# Patient Record
Sex: MALE | Race: WHITE | NOT HISPANIC OR LATINO | Employment: UNEMPLOYED | URBAN - NONMETROPOLITAN AREA
[De-identification: names, ages, dates, MRNs, and addresses within clinical notes are randomized per-mention and may not be internally consistent; named-entity substitution may affect disease eponyms.]

---

## 2018-09-07 ENCOUNTER — APPOINTMENT (OUTPATIENT)
Dept: GENERAL RADIOLOGY | Age: 53
End: 2018-09-07
Attending: EMERGENCY MEDICINE

## 2018-09-07 ENCOUNTER — HOSPITAL ENCOUNTER (EMERGENCY)
Age: 53
Discharge: PSYCHIATRIC OR SUBSTANCE USE FACILITY | End: 2018-09-07
Attending: EMERGENCY MEDICINE

## 2018-09-07 VITALS
HEART RATE: 71 BPM | OXYGEN SATURATION: 97 % | SYSTOLIC BLOOD PRESSURE: 131 MMHG | HEIGHT: 68 IN | TEMPERATURE: 98 F | WEIGHT: 200 LBS | BODY MASS INDEX: 30.31 KG/M2 | RESPIRATION RATE: 17 BRPM | DIASTOLIC BLOOD PRESSURE: 78 MMHG

## 2018-09-07 DIAGNOSIS — F10.220 ALCOHOL INTOXICATION IN ACTIVE ALCOHOLIC WITHOUT COMPLICATION (CMD): Primary | ICD-10-CM

## 2018-09-07 DIAGNOSIS — R06.00 DYSPNEA, UNSPECIFIED TYPE: ICD-10-CM

## 2018-09-07 DIAGNOSIS — J06.9 ACUTE URI: ICD-10-CM

## 2018-09-07 DIAGNOSIS — F17.200 TOBACCO DEPENDENCE: ICD-10-CM

## 2018-09-07 LAB
ALBUMIN SERPL-MCNC: 4.1 G/DL (ref 3.6–5.1)
ALBUMIN/GLOB SERPL: 1.2 {RATIO} (ref 1–2.4)
ALP SERPL-CCNC: 78 UNITS/L (ref 45–117)
ALT SERPL-CCNC: 50 UNITS/L
AMPHETAMINES UR QL: NEGATIVE
ANION GAP SERPL CALC-SCNC: 12 MMOL/L (ref 10–20)
APPEARANCE UR: CLEAR
AST SERPL-CCNC: 53 UNITS/L
BARBITURATES UR QL: NEGATIVE
BASOPHILS # BLD AUTO: 0 K/MCL (ref 0–0.3)
BASOPHILS NFR BLD AUTO: 0 %
BENZODIAZ UR QL: NEGATIVE
BILIRUB SERPL-MCNC: 0.4 MG/DL (ref 0.2–1)
BILIRUB UR QL STRIP: NEGATIVE
BUN SERPL-MCNC: 9 MG/DL (ref 6–20)
BUN/CREAT SERPL: 10 (ref 7–25)
BZE UR QL: NEGATIVE
CALCIUM SERPL-MCNC: 8.3 MG/DL (ref 8.4–10.2)
CANNABINOIDS UR QL SCN: NEGATIVE
CHLORIDE SERPL-SCNC: 109 MMOL/L (ref 98–107)
CO2 SERPL-SCNC: 32 MMOL/L (ref 21–32)
COLOR UR: YELLOW
CREAT SERPL-MCNC: 0.92 MG/DL (ref 0.67–1.17)
D DIMER PPP FEU-MCNC: 0.53 MG/L (FEU)
DIFFERENTIAL METHOD BLD: ABNORMAL
EOSINOPHIL # BLD AUTO: 0.1 K/MCL (ref 0.1–0.5)
EOSINOPHIL NFR SPEC: 1 %
ERYTHROCYTE [DISTWIDTH] IN BLOOD: 12.9 % (ref 11–15)
ETHANOL SERPL-MCNC: 438 MG/DL
GLOBULIN SER-MCNC: 3.3 G/DL (ref 2–4)
GLUCOSE SERPL-MCNC: 115 MG/DL (ref 65–99)
GLUCOSE UR STRIP-MCNC: NEGATIVE MG/DL
HCT VFR BLD CALC: 47.8 % (ref 39–51)
HGB BLD-MCNC: 16.7 G/DL (ref 13–17)
HGB UR QL STRIP: NEGATIVE
KETONES UR STRIP-MCNC: NEGATIVE MG/DL
LEUKOCYTE ESTERASE UR QL STRIP: NEGATIVE
LYMPHOCYTES # BLD MANUAL: 0.9 K/MCL (ref 1–4)
LYMPHOCYTES NFR BLD MANUAL: 17 %
MAGNESIUM SERPL-MCNC: 2.4 MG/DL (ref 1.7–2.4)
MCH RBC QN AUTO: 33.9 PG (ref 26–34)
MCHC RBC AUTO-ENTMCNC: 34.9 G/DL (ref 32–36.5)
MCV RBC AUTO: 97.2 FL (ref 78–100)
MONOCYTES # BLD MANUAL: 0.3 K/MCL (ref 0.3–0.9)
MONOCYTES NFR BLD MANUAL: 6 %
NEUTROPHILS # BLD: 4.2 K/MCL (ref 1.8–7.7)
NEUTROPHILS NFR BLD AUTO: 76 %
NITRITE UR QL STRIP: NEGATIVE
OPIATES UR QL: NEGATIVE
PCP UR QL: NEGATIVE
PH UR STRIP: 6.5 UNITS (ref 5–7)
PLATELET # BLD: 260 K/MCL (ref 140–450)
POTASSIUM SERPL-SCNC: 4.2 MMOL/L (ref 3.4–5.1)
PROT SERPL-MCNC: 7.4 G/DL (ref 6.4–8.2)
PROT UR STRIP-MCNC: NEGATIVE MG/DL
RBC # BLD: 4.92 MIL/MCL (ref 4.5–5.9)
SODIUM SERPL-SCNC: 149 MMOL/L (ref 135–145)
SP GR UR STRIP: 1.01 (ref 1–1.03)
SPECIMEN SOURCE: NORMAL
TROPONIN I SERPL-MCNC: <0.02 NG/ML
UROBILINOGEN UR STRIP-MCNC: 0.2 MG/DL (ref 0–1)
WBC # BLD: 5.5 K/MCL (ref 4.2–11)

## 2018-09-07 PROCEDURE — 83735 ASSAY OF MAGNESIUM: CPT

## 2018-09-07 PROCEDURE — 99285 EMERGENCY DEPT VISIT HI MDM: CPT

## 2018-09-07 PROCEDURE — 99284 EMERGENCY DEPT VISIT MOD MDM: CPT | Performed by: EMERGENCY MEDICINE

## 2018-09-07 PROCEDURE — 71045 X-RAY EXAM CHEST 1 VIEW: CPT

## 2018-09-07 PROCEDURE — 81003 URINALYSIS AUTO W/O SCOPE: CPT

## 2018-09-07 PROCEDURE — 85025 COMPLETE CBC W/AUTO DIFF WBC: CPT

## 2018-09-07 PROCEDURE — 80320 DRUG SCREEN QUANTALCOHOLS: CPT

## 2018-09-07 PROCEDURE — 80053 COMPREHEN METABOLIC PANEL: CPT

## 2018-09-07 PROCEDURE — 80307 DRUG TEST PRSMV CHEM ANLYZR: CPT

## 2018-09-07 PROCEDURE — 93005 ELECTROCARDIOGRAM TRACING: CPT | Performed by: EMERGENCY MEDICINE

## 2018-09-07 PROCEDURE — 85379 FIBRIN DEGRADATION QUANT: CPT

## 2018-09-07 PROCEDURE — 93010 ELECTROCARDIOGRAM REPORT: CPT | Performed by: INTERNAL MEDICINE

## 2018-09-07 PROCEDURE — 36415 COLL VENOUS BLD VENIPUNCTURE: CPT

## 2018-09-07 PROCEDURE — 71045 X-RAY EXAM CHEST 1 VIEW: CPT | Performed by: RADIOLOGY

## 2018-09-07 PROCEDURE — 84484 ASSAY OF TROPONIN QUANT: CPT

## 2018-09-07 RX ORDER — LEVOTHYROXINE SODIUM 0.1 MG/1
100 TABLET ORAL DAILY
COMMUNITY

## 2018-09-07 ASSESSMENT — ENCOUNTER SYMPTOMS
APPETITE CHANGE: 0
COLOR CHANGE: 0
DIAPHORESIS: 0
LIGHT-HEADEDNESS: 0
NUMBNESS: 0
COUGH: 1
RHINORRHEA: 1
SORE THROAT: 0
WOUND: 0
SINUS PAIN: 0
CONSTIPATION: 0
DIZZINESS: 0
VOMITING: 0
SPEECH DIFFICULTY: 0
TROUBLE SWALLOWING: 0
CONFUSION: 0
ACTIVITY CHANGE: 0
WEAKNESS: 0
WHEEZING: 1
FACIAL ASYMMETRY: 0
NAUSEA: 0
DIARRHEA: 0
HEADACHES: 0
BACK PAIN: 0
HALLUCINATIONS: 0
STRIDOR: 0
SHORTNESS OF BREATH: 1
FACIAL SWELLING: 0
CHEST TIGHTNESS: 0
FEVER: 0
ABDOMINAL PAIN: 0
AGITATION: 1
FATIGUE: 0
CHILLS: 0

## 2018-09-10 LAB
ATRIAL RATE (BPM): 71
DIASTOLIC BLOOD PRESSURE: 101
P AXIS (DEGREES): 60
PR-INTERVAL (MSEC): 130
QRS-INTERVAL (MSEC): 92
QT-INTERVAL (MSEC): 380
QTC: 412
R AXIS (DEGREES): 21
REPORT TEXT: NORMAL
SYSTOLIC BLOOD PRESSURE: 159
T AXIS (DEGREES): 68
VENTRICULAR RATE EKG/MIN (BPM): 71

## 2018-09-11 ENCOUNTER — OFFICE VISIT (OUTPATIENT)
Dept: OCCUPATIONAL MEDICINE | Age: 53
End: 2018-09-11

## 2018-09-11 DIAGNOSIS — Z00.8 HEALTH EXAMINATION IN POPULATION SURVEY: Primary | ICD-10-CM

## 2018-09-11 PROCEDURE — OH035 DOT/N-DOT DS COLLECTION ONLY (ALL TYPES): Performed by: PHYSICIAN ASSISTANT

## 2020-09-28 ENCOUNTER — APPOINTMENT (OUTPATIENT)
Dept: RADIOLOGY | Facility: HOSPITAL | Age: 55
End: 2020-09-28
Payer: COMMERCIAL

## 2020-09-28 ENCOUNTER — APPOINTMENT (OUTPATIENT)
Dept: CT IMAGING | Facility: HOSPITAL | Age: 55
End: 2020-09-28
Payer: COMMERCIAL

## 2020-09-28 ENCOUNTER — HOSPITAL ENCOUNTER (EMERGENCY)
Facility: HOSPITAL | Age: 55
Discharge: HOME | End: 2020-09-28
Attending: EMERGENCY MEDICINE
Payer: COMMERCIAL

## 2020-09-28 VITALS
HEIGHT: 67 IN | DIASTOLIC BLOOD PRESSURE: 111 MMHG | RESPIRATION RATE: 16 BRPM | BODY MASS INDEX: 26.9 KG/M2 | HEART RATE: 79 BPM | SYSTOLIC BLOOD PRESSURE: 160 MMHG | WEIGHT: 171.4 LBS | OXYGEN SATURATION: 94 % | TEMPERATURE: 98.1 F

## 2020-09-28 DIAGNOSIS — R11.0 NAUSEA: ICD-10-CM

## 2020-09-28 DIAGNOSIS — R50.9 FEVER: ICD-10-CM

## 2020-09-28 DIAGNOSIS — R05.9 COUGH: Primary | ICD-10-CM

## 2020-09-28 LAB
ANION GAP SERPL CALC-SCNC: 6 MMOL/L (ref 3–11)
BASOPHILS # BLD AUTO: 0 10*3/UL
BASOPHILS NFR BLD AUTO: 1 % (ref 0–2)
BUN SERPL-MCNC: 13 MG/DL (ref 7–25)
CALCIUM SERPL-MCNC: 7.6 MG/DL (ref 8.6–10.3)
CHLORIDE SERPL-SCNC: 101 MMOL/L (ref 98–107)
CO2 SERPL-SCNC: 24 MMOL/L (ref 21–32)
CREAT SERPL-MCNC: 0.78 MG/DL (ref 0.7–1.3)
EOSINOPHIL # BLD AUTO: 0.2 10*3/UL
EOSINOPHIL NFR BLD AUTO: 5 % (ref 0–3)
ERYTHROCYTE [DISTWIDTH] IN BLOOD BY AUTOMATED COUNT: 13.6 % (ref 11.5–15)
FIBRIN D-DIMER (NG/ML) IN PLATELET POOR PLASMA: 1576 NG/ML FEU
GFR SERPL CREATININE-BSD FRML MDRD: 101 ML/MIN/1.73M*2
GLUCOSE SERPL-MCNC: 108 MG/DL (ref 70–105)
HCT VFR BLD AUTO: 42.8 % (ref 38–50)
HGB BLD-MCNC: 15.3 G/DL (ref 13.2–17.2)
LYMPHOCYTES # BLD AUTO: 0.4 10*3/UL
LYMPHOCYTES NFR BLD AUTO: 12 % (ref 15–47)
MCH RBC QN AUTO: 33.7 PG (ref 29–34)
MCHC RBC AUTO-ENTMCNC: 35.8 G/DL (ref 32–36)
MCV RBC AUTO: 94.1 FL (ref 82–97)
MONOCYTES # BLD AUTO: 0.2 10*3/UL
MONOCYTES NFR BLD AUTO: 6 % (ref 5–13)
NEUTROPHILS # BLD AUTO: 2.6 10*3/UL
NEUTROPHILS NFR BLD AUTO: 76 % (ref 46–70)
PLATELET # BLD AUTO: 175 10*3/UL (ref 130–350)
PMV BLD AUTO: 7.4 FL (ref 6.9–10.8)
POTASSIUM SERPL-SCNC: 3.9 MMOL/L (ref 3.5–5.1)
RBC # BLD AUTO: 4.55 10*6/ΜL (ref 4.1–5.8)
SARS-COV-2 RNA RESP QL NAA+PROBE: NEGATIVE
SODIUM SERPL-SCNC: 131 MMOL/L (ref 135–145)
TROPONIN I SERPL-MCNC: 10.6 PG/ML
WBC # BLD AUTO: 3.5 10*3/UL (ref 3.7–9.6)

## 2020-09-28 PROCEDURE — 85379 FIBRIN DEGRADATION QUANT: CPT | Performed by: NURSE PRACTITIONER

## 2020-09-28 PROCEDURE — 36415 COLL VENOUS BLD VENIPUNCTURE: CPT | Performed by: NURSE PRACTITIONER

## 2020-09-28 PROCEDURE — 99284 EMERGENCY DEPT VISIT MOD MDM: CPT | Performed by: EMERGENCY MEDICINE

## 2020-09-28 PROCEDURE — 93005 ELECTROCARDIOGRAM TRACING: CPT

## 2020-09-28 PROCEDURE — 71045 X-RAY EXAM CHEST 1 VIEW: CPT

## 2020-09-28 PROCEDURE — 85025 COMPLETE CBC W/AUTO DIFF WBC: CPT | Performed by: NURSE PRACTITIONER

## 2020-09-28 PROCEDURE — 2580000300 HC RX 258: Performed by: NURSE PRACTITIONER

## 2020-09-28 PROCEDURE — 87635 SARS-COV-2 COVID-19 AMP PRB: CPT | Performed by: NURSE PRACTITIONER

## 2020-09-28 PROCEDURE — C9803 HOPD COVID-19 SPEC COLLECT: HCPCS | Performed by: NURSE PRACTITIONER

## 2020-09-28 PROCEDURE — 84484 ASSAY OF TROPONIN QUANT: CPT | Performed by: NURSE PRACTITIONER

## 2020-09-28 PROCEDURE — 2550000100 HC RX 255: Performed by: NURSE PRACTITIONER

## 2020-09-28 PROCEDURE — 80048 BASIC METABOLIC PNL TOTAL CA: CPT | Performed by: NURSE PRACTITIONER

## 2020-09-28 PROCEDURE — G1004 CDSM NDSC: HCPCS

## 2020-09-28 RX ORDER — ATORVASTATIN CALCIUM 20 MG/1
20 TABLET, FILM COATED ORAL DAILY
COMMUNITY

## 2020-09-28 RX ORDER — ONDANSETRON 4 MG/1
4 TABLET, ORALLY DISINTEGRATING ORAL EVERY 8 HOURS PRN
Qty: 20 TABLET | Refills: 0 | Status: SHIPPED | OUTPATIENT
Start: 2020-09-28 | End: 2020-10-05

## 2020-09-28 RX ORDER — TAMSULOSIN HYDROCHLORIDE 0.4 MG/1
0.4 CAPSULE ORAL DAILY
COMMUNITY

## 2020-09-28 RX ORDER — LEVOTHYROXINE SODIUM 125 UG/1
125 TABLET ORAL DAILY
COMMUNITY

## 2020-09-28 RX ORDER — SODIUM CHLORIDE 9 MG/ML
1000 INJECTION, SOLUTION INTRAVENOUS ONCE
Status: COMPLETED | OUTPATIENT
Start: 2020-09-28 | End: 2020-09-28

## 2020-09-28 RX ORDER — IOPAMIDOL 755 MG/ML
61 INJECTION, SOLUTION INTRAVASCULAR ONCE
Status: COMPLETED | OUTPATIENT
Start: 2020-09-28 | End: 2020-09-28

## 2020-09-28 RX ADMIN — IOPAMIDOL 61 ML: 755 INJECTION, SOLUTION INTRAVENOUS at 14:15

## 2020-09-28 RX ADMIN — SODIUM CHLORIDE 1000 ML: 9 INJECTION, SOLUTION INTRAVENOUS at 12:05

## 2020-09-28 ASSESSMENT — ENCOUNTER SYMPTOMS
ARTHRALGIAS: 0
PALPITATIONS: 0
EYE PAIN: 0
SORE THROAT: 0
FEVER: 0
APPETITE CHANGE: 1
NAUSEA: 1
HEMATURIA: 0
FATIGUE: 1
BACK PAIN: 0
COUGH: 0
DIARRHEA: 0
SHORTNESS OF BREATH: 1
DYSURIA: 0
ABDOMINAL PAIN: 0
SEIZURES: 0
COLOR CHANGE: 0
VOMITING: 1
ACTIVITY CHANGE: 1
CHILLS: 0

## 2020-09-28 NOTE — ED PROCEDURE NOTE
Procedure  ECG 12 lead - Shortness of breath    Date/Time: 9/28/2020 12:27 PM  Performed by: Jayce Mccray MD  Authorized by: Guera Post CNP     ECG reviewed by ED Physician in the absence of a cardiologist: yes    Previous ECG:     Previous ECG:  Unavailable  Comments:      Sinus rhythm with a rate of 80, intervals normal, axis upright and normal, isolated PVC noted.  T waves upright and normal, no ST elevation or depressions noted.                 Jayce Mccray MD  09/28/20 2048

## 2020-09-28 NOTE — ED PROVIDER NOTES
HPI:  Chief Complaint   Patient presents with   • Nausea     Pt. states fever and chills started last night. Pt. also complains of dizziness, nausea and vomiting.        4-year-old male presents with  2 days nausea, 1-2 episodes of vomiting, chills, body aches, occasional cough and sore throat.  Patient is a smoker and feels the cough is similar to previous smoker's cough and not new or different.  He also admits dizziness described as lightheadedness and weakness that worsens when changing positions quickly that is associated with subjective fever.  Patient denies diarrhea, dysuria.  Also admits some chest tightness and shortness of breath that he has not ever had before.  Denies history of coronary artery disease or previous stress test but does have a significant family history of CVA and stroke.  He is a current smoker of half pack per day for many years.  He has been resting in his hotel room and taking ibuprofen,  tylenol without improvement.  States he has no appetite but is uncertain about loss of taste or smell.  Patient has a recent history of traveling from [place] via car.  States he did get out of the vehicle every 2-3 hours to stretch his legs and is not have a history of blood clot in his legs or lungs.  He does also have a recent history of meniscal tear and knee arthroscopy 2 weeks ago.  States this is healing and without difficulty.  He denies any unexpected discomfort to the knee, low right lower extremity swelling.  Admits bruising is significantly improving.      History provided by:  Patient      HISTORY:  Past Medical History:   Diagnosis Date   • BPH (benign prostatic hyperplasia)    • Disease of thyroid gland    • Hypercholesteremia        Past Surgical History:   Procedure Laterality Date   • HERNIA REPAIR     • KNEE SURGERY         History reviewed. No pertinent family history.    Social History     Tobacco Use   • Smoking status: Current Every Day Smoker     Packs/day: 0.50      Types: Cigarettes   • Smokeless tobacco: Never Used   Substance Use Topics   • Alcohol use: Yes   • Drug use: Not Currently         ROS:  Review of Systems   Constitutional: Positive for activity change, appetite change and fatigue. Negative for chills and fever.   HENT: Negative for congestion, ear pain and sore throat.    Eyes: Negative for pain and visual disturbance.   Respiratory: Positive for shortness of breath. Negative for cough.    Cardiovascular: Negative for chest pain and palpitations.   Gastrointestinal: Positive for nausea and vomiting. Negative for abdominal pain and diarrhea.   Genitourinary: Negative for dysuria and hematuria.   Musculoskeletal: Negative for arthralgias and back pain.   Skin: Negative for color change and rash.   Neurological: Negative for seizures and syncope.   All other systems reviewed and are negative.      PE:  ED Triage Vitals   Temp Heart Rate Resp BP SpO2   09/28/20 1052 09/28/20 1052 09/28/20 1052 09/28/20 1052 09/28/20 1052   36.7 °C (98.1 °F) 105 16 (!) 191/115 95 %      Temp Source Heart Rate Source Patient Position BP Location FiO2 (%)   09/28/20 1052 -- 09/28/20 1130 -- --   Oral  Head of bed 30 degrees or higher         Physical Exam  Vitals signs and nursing note reviewed.   Constitutional:       Appearance: He is well-developed and normal weight.   HENT:      Head: Normocephalic and atraumatic.      Nose: Nose normal.      Mouth/Throat:      Mouth: Mucous membranes are dry.   Eyes:      Conjunctiva/sclera: Conjunctivae normal.   Neck:      Musculoskeletal: Neck supple.   Cardiovascular:      Rate and Rhythm: Normal rate and regular rhythm.      Heart sounds: No murmur.   Pulmonary:      Effort: Pulmonary effort is normal. No respiratory distress.      Breath sounds: Normal breath sounds.   Abdominal:      General: Abdomen is flat. Bowel sounds are normal.      Palpations: Abdomen is soft.      Tenderness: There is no abdominal tenderness.   Skin:     General: Skin  "is warm and dry.   Neurological:      General: No focal deficit present.      Mental Status: He is alert and oriented to person, place, and time.         ED LABS:  Labs Reviewed   D-DIMER, QUANTITATIVE - Abnormal       Result Value    D-Dimer, Quant (ng/mL) 1,576 (*)     Narrative:     D-dimer values increase with age and this can make VTE exclusion of an older population difficult. To address this, The [employer], based on best available evidence and recent guidelines, recommends that clinicians use age-adjusted D-dimer thresholds in patients greater than 50 years of age with: a) a low probability of PE who do not meet all Pulmonary Embolism Rule Out Criteria, or b) in those with intermediate probability of PE. The formula for an age-adjusted D-dimer cut-off is \"ageX10 ng/mL\". For example, a 60 year old patient would have an age-adjusted cut-off of 600 ng/mL FEU and an 80 year old would be 800 ng/mL FEU.  D-dimer values < or =500 ng/mL FEU may be used in conjunction with clinical pretest probability to exclude deep vein thrombosis (DVT) and pulmonary embolism (PE).   CBC WITH AUTO DIFFERENTIAL - Abnormal    WBC 3.5 (*)     RBC 4.55      Hemoglobin 15.3      Hematocrit 42.8      MCV 94.1      MCH 33.7      MCHC 35.8      RDW 13.6      Platelets 175      MPV 7.4      Neutrophils% 76 (*)     Lymphocytes% 12 (*)     Monocytes% 6      Eosinophils% 5 (*)     Basophils% 1      Neutrophils Absolute 2.60      Lymphocytes Absolute 0.40      Monocytes Absolute 0.20      Eosinophils Absolute 0.20      Basophils Absolute 0.00     BASIC METABOLIC PANEL - Abnormal    Sodium 131 (*)     Potassium 3.9      Chloride 101      CO2 24      BUN 13      Creatinine 0.78      Glucose 108 (*)     Calcium 7.6 (*)     Anion Gap 6      eGFR 101      Narrative:     Estimated GFR calculated using the 2009 CKD-EPI creatinine equation.   COVID-19 MOLECULAR (PCR) - Normal    COVID-19 PCR Negative      Narrative:     COVID-19 NP " sample collection can be used for Respiratory Pathogen Panel testing.  A negative result may not rule out COVID-19 in the patient, as the sensitivity of the test depends on the timing of the specimen collection, type of specimen, and the quality of the specimen. Result should be correlated with patient's history and clinical presentation.    Performance of this SARS-CoV-2 RNA test has only been established in individuals suspected of having COVID-19 by their healthcare provider.    Testing was performed using the Xpert Xpress SARS-CoV-2 RT-PCR assay ([employer]) on the [employer] system.  Fact sheets for this Emergency Use Authorization (EUA) assay can be found at the following links:  For Healthcare Providers  https://www.fda.gov/media/130222/download  For Patients  https://www.fda.gov/media/671836/download   HIGH SENSITIVE TROPONIN I - Normal    hsTnI 0 Hour 10.6           ED IMAGES:  CT angiogram chest PE with IV contrast   Final Result   IMPRESSION:   1. Negative CTA for pulmonary embolism.      XR chest portable 1 view   Final Result   IMPRESSION:   1. Negative chest             ED PROCEDURES:  Procedures    ED COURSE:  ED Course as of Sep 28 1424   Mon Sep 28, 2020   1249 No evidence of pneumonia on chest x-ray.  No significant white blood cell count although leukopenia consistent with possible viral illness.  Troponin is negative.  EKG is negative for acute ST-T wave changes diagnostic for ischemia or injury.  D-dimer is elevated.  For this reason CTA will be obtained to rule out PE.    [AJ]   1418 CTA negative for PE.  Patient is not hypoxic and no acute respiratory distress.  At this point he is felt stable for discharge.  Continued supportive cares and return if worsening symptoms.     [AJ]      ED Course User Index  [AJ] Guera Post CNP            MDM:  MDM  54-year-old male presents with multiple complaints including nausea, vomiting, and poor appetite dizziness worsening over the last 1-1/2 to 2  days.  He has recent travel with long episodes in the car as well as a recent elective surgery for which she was discharged the day of.  Does have some risk factors for PE given her shortness of breath and chest pain for this reason d-dimer will be obtained, lipid screening and baseline labs.  At this point patient has no knowledge of previous elevated blood pressure and states this is because he is in the emergency department we will continue to evaluate this.      Josh Hyde was evaluated in the Emergency Department on 9/28/2020 for the symptoms described in the history of present illness. He was evaluated in the context of the global COVID-19 pandemic, which necessitated consideration that the patient might be at risk for infection with the SARS-COV-2 virus that causes COVID-19. Institutional protocols and algorithms that pertain to the evaluation of patients at risk for COVID-19 are in a state of rapid change based on information released by regulatory bodies including the CDC and federal and state regulations. These policies and algorithms were followed during the patient's care in the ED.  Final diagnoses:   [R05] Cough   [R50.9] Fever   [R11.0] Nausea        Guera Post, JACQUES  09/28/20 [id number]

## 2020-09-28 NOTE — DISCHARGE INSTRUCTIONS
Continue supportive care for perceived URI.  COVID screening is negative.  May use Tylenol, ibuprofen, over-the-counter decongestants or cold medicines.  Monitor blood pressure once you are feeling well and see her primary care provider if this remains above 140/90 on a routine basis as you may need to consider treatment for high blood pressure.      You will be provided with a prescription for nausea medication you may use this 3 times daily as needed, drink adequate fluids to prevent constipation which is a common adverse effect.

## 2020-09-28 NOTE — ED ATTESTATION NOTE
I performed a history and physical examination of Josh Hyde and discussed his management with Advanced Practice Provider, Guera Post CNP.  I agree with the history, physical, assessment, and plan of care.  On my face-to-face visit with patient, 54-year-old male presents here to the ED with complaints of dizziness, nauseavomiting, generalized myalgias, and mild shortness of breath.  Patient had vomiting 1-2 episodes.  Physical exam with clear lungs, mild tachycardia noted on the monitor, and heart rate with no murmurs rubs or gallops.  No lower extremity edema.  Abdomen is benign.  Work-up showed a mild hyponatremia to 131, and elevated d-dimer to 1576.  COVID was negative.  Troponin normal.  Due to elevated dimer and troponin negative, CTA was done to evaluate for PE.  CTA unremarkable for dissection or for PE.  Discussed findings with patient.  Feel safe to go home with URI at this time.  Patient with no history of hypertension, and blood pressure in the ED in the 150s 160s, but patient states that is due to anxiety about being in the ED.  Feel safe to discharge.         Jayce Mccray MD  09/28/20 0219

## 2021-07-28 DIAGNOSIS — M54.16 LUMBAR RADICULOPATHY: Primary | ICD-10-CM

## 2021-08-11 ENCOUNTER — HOSPITAL ENCOUNTER (OUTPATIENT)
Dept: REHABILITATION | Age: 56
Discharge: STILL ADMITTED OR ADMINISTRATIVE | End: 2021-08-11
Attending: NEUROLOGICAL SURGERY

## 2021-08-11 PROCEDURE — 97162 PT EVAL MOD COMPLEX 30 MIN: CPT | Performed by: PHYSICAL THERAPIST

## 2021-08-11 PROCEDURE — 10004173 HB COUNTER-THERAPY VISIT PT: Performed by: PHYSICAL THERAPIST

## 2021-08-11 ASSESSMENT — ENCOUNTER SYMPTOMS
SUBJECTIVE PAIN PROGRESSION: NO CHANGE
PAIN SCALE AT HIGHEST: 9
QUALITY: SHARP
PAIN SEVERITY NOW: 5
PAIN SCALE AT LOWEST: 1
QUALITY: ACHE
ALLEVIATING FACTORS: REST
ALLEVIATING FACTORS: ICE

## 2021-08-18 ENCOUNTER — HOSPITAL ENCOUNTER (OUTPATIENT)
Dept: REHABILITATION | Age: 56
Discharge: STILL ADMITTED OR ADMINISTRATIVE | End: 2021-08-18
Attending: NEUROLOGICAL SURGERY

## 2021-08-18 PROCEDURE — 97110 THERAPEUTIC EXERCISES: CPT | Performed by: PHYSICAL THERAPIST

## 2021-08-18 PROCEDURE — 10004173 HB COUNTER-THERAPY VISIT PT: Performed by: PHYSICAL THERAPIST

## 2021-08-18 PROCEDURE — 97140 MANUAL THERAPY 1/> REGIONS: CPT | Performed by: PHYSICAL THERAPIST

## 2021-08-20 ENCOUNTER — HOSPITAL ENCOUNTER (OUTPATIENT)
Dept: REHABILITATION | Age: 56
End: 2021-08-20
Attending: NEUROLOGICAL SURGERY

## 2021-08-25 ENCOUNTER — APPOINTMENT (OUTPATIENT)
Dept: REHABILITATION | Age: 56
End: 2021-08-25
Attending: NEUROLOGICAL SURGERY

## 2021-08-31 ENCOUNTER — APPOINTMENT (OUTPATIENT)
Dept: REHABILITATION | Age: 56
End: 2021-08-31
Attending: NEUROLOGICAL SURGERY

## 2021-09-03 ENCOUNTER — APPOINTMENT (OUTPATIENT)
Dept: REHABILITATION | Age: 56
End: 2021-09-03
Attending: NEUROLOGICAL SURGERY

## 2021-09-07 ENCOUNTER — HOSPITAL ENCOUNTER (OUTPATIENT)
Dept: REHABILITATION | Age: 56
Discharge: STILL ADMITTED OR ADMINISTRATIVE | End: 2021-09-07
Attending: NEUROLOGICAL SURGERY

## 2021-09-07 PROCEDURE — 97110 THERAPEUTIC EXERCISES: CPT | Performed by: PHYSICAL THERAPIST

## 2021-09-07 PROCEDURE — 10004173 HB COUNTER-THERAPY VISIT PT: Performed by: PHYSICAL THERAPIST

## 2021-09-07 PROCEDURE — 97140 MANUAL THERAPY 1/> REGIONS: CPT | Performed by: PHYSICAL THERAPIST

## 2021-09-07 ASSESSMENT — ENCOUNTER SYMPTOMS: PAIN SEVERITY NOW: 6

## 2021-09-09 ENCOUNTER — APPOINTMENT (OUTPATIENT)
Dept: REHABILITATION | Age: 56
End: 2021-09-09
Attending: NEUROLOGICAL SURGERY

## 2021-09-10 ENCOUNTER — HOSPITAL ENCOUNTER (OUTPATIENT)
Dept: REHABILITATION | Age: 56
Discharge: STILL ADMITTED OR ADMINISTRATIVE | End: 2021-09-10
Attending: NEUROLOGICAL SURGERY

## 2021-09-10 PROCEDURE — 97110 THERAPEUTIC EXERCISES: CPT | Performed by: PHYSICAL THERAPY ASSISTANT

## 2021-09-10 PROCEDURE — 10004173 HB COUNTER-THERAPY VISIT PT: Performed by: PHYSICAL THERAPY ASSISTANT

## 2021-09-10 PROCEDURE — 97140 MANUAL THERAPY 1/> REGIONS: CPT | Performed by: PHYSICAL THERAPY ASSISTANT

## 2021-09-10 ASSESSMENT — ENCOUNTER SYMPTOMS: PAIN SEVERITY NOW: 5

## 2021-09-14 ENCOUNTER — HOSPITAL ENCOUNTER (OUTPATIENT)
Dept: REHABILITATION | Age: 56
Discharge: STILL ADMITTED OR ADMINISTRATIVE | End: 2021-09-14
Attending: NEUROLOGICAL SURGERY

## 2021-09-14 PROCEDURE — 97110 THERAPEUTIC EXERCISES: CPT | Performed by: PHYSICAL THERAPIST

## 2021-09-14 PROCEDURE — 97530 THERAPEUTIC ACTIVITIES: CPT | Performed by: PHYSICAL THERAPIST

## 2021-09-14 PROCEDURE — 97140 MANUAL THERAPY 1/> REGIONS: CPT | Performed by: PHYSICAL THERAPIST

## 2021-09-14 PROCEDURE — 10004173 HB COUNTER-THERAPY VISIT PT: Performed by: PHYSICAL THERAPIST

## 2021-09-14 ASSESSMENT — ENCOUNTER SYMPTOMS: PAIN SEVERITY NOW: 3

## 2021-09-17 ENCOUNTER — HOSPITAL ENCOUNTER (OUTPATIENT)
Dept: REHABILITATION | Age: 56
Discharge: STILL ADMITTED OR ADMINISTRATIVE | End: 2021-09-17
Attending: NEUROLOGICAL SURGERY

## 2021-09-17 PROCEDURE — 10004173 HB COUNTER-THERAPY VISIT PT: Performed by: PHYSICAL THERAPIST

## 2021-09-17 PROCEDURE — 97110 THERAPEUTIC EXERCISES: CPT | Performed by: PHYSICAL THERAPIST

## 2021-09-17 PROCEDURE — 97140 MANUAL THERAPY 1/> REGIONS: CPT | Performed by: PHYSICAL THERAPIST

## 2021-09-17 ASSESSMENT — ENCOUNTER SYMPTOMS: PAIN SEVERITY NOW: 3

## 2021-09-21 ENCOUNTER — HOSPITAL ENCOUNTER (OUTPATIENT)
Dept: REHABILITATION | Age: 56
Discharge: STILL ADMITTED OR ADMINISTRATIVE | End: 2021-09-21
Attending: NEUROLOGICAL SURGERY

## 2021-09-21 PROCEDURE — 10004173 HB COUNTER-THERAPY VISIT PT: Performed by: PHYSICAL THERAPIST

## 2021-09-21 PROCEDURE — 97140 MANUAL THERAPY 1/> REGIONS: CPT | Performed by: PHYSICAL THERAPIST

## 2021-09-21 PROCEDURE — 97110 THERAPEUTIC EXERCISES: CPT | Performed by: PHYSICAL THERAPIST

## 2021-09-21 ASSESSMENT — ENCOUNTER SYMPTOMS: PAIN SEVERITY NOW: 6

## 2021-09-24 ENCOUNTER — HOSPITAL ENCOUNTER (OUTPATIENT)
Dept: REHABILITATION | Age: 56
Discharge: STILL ADMITTED OR ADMINISTRATIVE | End: 2021-09-24
Attending: NEUROLOGICAL SURGERY

## 2021-09-24 PROCEDURE — 97140 MANUAL THERAPY 1/> REGIONS: CPT | Performed by: PHYSICAL THERAPY ASSISTANT

## 2021-09-24 PROCEDURE — 97110 THERAPEUTIC EXERCISES: CPT | Performed by: PHYSICAL THERAPY ASSISTANT

## 2021-09-24 PROCEDURE — 10004173 HB COUNTER-THERAPY VISIT PT: Performed by: PHYSICAL THERAPY ASSISTANT

## 2021-09-24 ASSESSMENT — ENCOUNTER SYMPTOMS: PAIN SEVERITY NOW: 3

## 2021-09-27 ENCOUNTER — HOSPITAL ENCOUNTER (OUTPATIENT)
Dept: REHABILITATION | Age: 56
Discharge: STILL ADMITTED OR ADMINISTRATIVE | End: 2021-09-27
Attending: NEUROLOGICAL SURGERY

## 2021-09-27 PROCEDURE — 10004173 HB COUNTER-THERAPY VISIT PT: Performed by: PHYSICAL THERAPY ASSISTANT

## 2021-09-27 PROCEDURE — 97110 THERAPEUTIC EXERCISES: CPT | Performed by: PHYSICAL THERAPY ASSISTANT

## 2021-09-27 ASSESSMENT — ENCOUNTER SYMPTOMS: PAIN SEVERITY NOW: 4

## 2021-09-29 ENCOUNTER — HOSPITAL ENCOUNTER (OUTPATIENT)
Dept: REHABILITATION | Age: 56
Discharge: STILL ADMITTED OR ADMINISTRATIVE | End: 2021-09-29
Attending: NEUROLOGICAL SURGERY

## 2021-09-29 PROCEDURE — 10004173 HB COUNTER-THERAPY VISIT PT: Performed by: PHYSICAL THERAPY ASSISTANT

## 2021-09-29 PROCEDURE — 97110 THERAPEUTIC EXERCISES: CPT | Performed by: PHYSICAL THERAPY ASSISTANT

## 2021-09-29 ASSESSMENT — ENCOUNTER SYMPTOMS: PAIN SEVERITY NOW: 5

## 2021-10-05 ENCOUNTER — HOSPITAL ENCOUNTER (OUTPATIENT)
Dept: REHABILITATION | Age: 56
Discharge: STILL ADMITTED OR ADMINISTRATIVE | End: 2021-10-05
Attending: NEUROLOGICAL SURGERY

## 2021-10-05 PROCEDURE — 97140 MANUAL THERAPY 1/> REGIONS: CPT | Performed by: PHYSICAL THERAPIST

## 2021-10-05 PROCEDURE — 97530 THERAPEUTIC ACTIVITIES: CPT | Performed by: PHYSICAL THERAPIST

## 2021-10-05 PROCEDURE — 10004173 HB COUNTER-THERAPY VISIT PT: Performed by: PHYSICAL THERAPIST

## 2021-10-05 ASSESSMENT — ENCOUNTER SYMPTOMS: PAIN SEVERITY NOW: 5

## 2021-10-08 ENCOUNTER — APPOINTMENT (OUTPATIENT)
Dept: REHABILITATION | Age: 56
End: 2021-10-08
Attending: NEUROLOGICAL SURGERY

## 2021-10-12 ENCOUNTER — APPOINTMENT (OUTPATIENT)
Dept: REHABILITATION | Age: 56
End: 2021-10-12
Attending: NEUROLOGICAL SURGERY

## 2021-10-15 ENCOUNTER — HOSPITAL ENCOUNTER (OUTPATIENT)
Dept: REHABILITATION | Age: 56
Discharge: STILL ADMITTED OR ADMINISTRATIVE | End: 2021-10-15
Attending: NEUROLOGICAL SURGERY

## 2021-10-15 PROCEDURE — 10004173 HB COUNTER-THERAPY VISIT PT: Performed by: PHYSICAL THERAPIST

## 2021-10-15 PROCEDURE — 97140 MANUAL THERAPY 1/> REGIONS: CPT | Performed by: PHYSICAL THERAPIST

## 2021-10-15 PROCEDURE — 97110 THERAPEUTIC EXERCISES: CPT | Performed by: PHYSICAL THERAPIST

## 2021-10-15 ASSESSMENT — ENCOUNTER SYMPTOMS: PAIN SEVERITY NOW: 5

## 2021-10-20 ENCOUNTER — APPOINTMENT (OUTPATIENT)
Dept: REHABILITATION | Age: 56
End: 2021-10-20
Attending: NEUROLOGICAL SURGERY

## 2021-10-22 ENCOUNTER — APPOINTMENT (OUTPATIENT)
Dept: REHABILITATION | Age: 56
End: 2021-10-22
Attending: NEUROLOGICAL SURGERY

## 2021-10-25 ENCOUNTER — APPOINTMENT (OUTPATIENT)
Dept: REHABILITATION | Age: 56
End: 2021-10-25
Attending: NEUROLOGICAL SURGERY

## 2021-10-26 ENCOUNTER — APPOINTMENT (OUTPATIENT)
Dept: REHABILITATION | Age: 56
End: 2021-10-26
Attending: NEUROLOGICAL SURGERY

## 2021-10-29 ENCOUNTER — HOSPITAL ENCOUNTER (OUTPATIENT)
Dept: REHABILITATION | Age: 56
Discharge: STILL ADMITTED OR ADMINISTRATIVE | End: 2021-10-29
Attending: NEUROLOGICAL SURGERY

## 2021-10-29 PROCEDURE — 97140 MANUAL THERAPY 1/> REGIONS: CPT | Performed by: PHYSICAL THERAPIST

## 2021-10-29 PROCEDURE — 97530 THERAPEUTIC ACTIVITIES: CPT | Performed by: PHYSICAL THERAPIST

## 2021-10-29 PROCEDURE — 10004173 HB COUNTER-THERAPY VISIT PT: Performed by: PHYSICAL THERAPIST

## 2021-10-29 ASSESSMENT — ENCOUNTER SYMPTOMS: PAIN SEVERITY NOW: 5

## 2021-11-01 ENCOUNTER — APPOINTMENT (OUTPATIENT)
Dept: REHABILITATION | Age: 56
End: 2021-11-01
Attending: NEUROLOGICAL SURGERY

## 2021-11-03 ENCOUNTER — HOSPITAL ENCOUNTER (OUTPATIENT)
Dept: REHABILITATION | Age: 56
Discharge: STILL ADMITTED OR ADMINISTRATIVE | End: 2021-11-03
Attending: NEUROLOGICAL SURGERY

## 2021-11-03 PROCEDURE — 10004173 HB COUNTER-THERAPY VISIT PT: Performed by: PHYSICAL THERAPY ASSISTANT

## 2021-11-03 PROCEDURE — 97110 THERAPEUTIC EXERCISES: CPT | Performed by: PHYSICAL THERAPY ASSISTANT

## 2021-11-03 ASSESSMENT — ENCOUNTER SYMPTOMS: PAIN SEVERITY NOW: 4

## 2021-11-09 ENCOUNTER — APPOINTMENT (OUTPATIENT)
Dept: REHABILITATION | Age: 56
End: 2021-11-09
Attending: NEUROLOGICAL SURGERY

## 2021-11-12 ENCOUNTER — TELEPHONE (OUTPATIENT)
Dept: REHABILITATION | Age: 56
End: 2021-11-12

## 2021-11-15 ENCOUNTER — HOSPITAL ENCOUNTER (OUTPATIENT)
Dept: REHABILITATION | Age: 56
Discharge: STILL ADMITTED OR ADMINISTRATIVE | End: 2021-11-15
Attending: NEUROLOGICAL SURGERY

## 2021-11-15 PROCEDURE — 97110 THERAPEUTIC EXERCISES: CPT | Performed by: PHYSICAL THERAPIST

## 2021-11-15 PROCEDURE — 10004173 HB COUNTER-THERAPY VISIT PT: Performed by: PHYSICAL THERAPIST

## 2021-11-15 ASSESSMENT — ENCOUNTER SYMPTOMS: PAIN SEVERITY NOW: 4

## 2021-11-22 ENCOUNTER — APPOINTMENT (OUTPATIENT)
Dept: REHABILITATION | Age: 56
End: 2021-11-22
Attending: NEUROLOGICAL SURGERY

## 2021-12-02 ENCOUNTER — APPOINTMENT (OUTPATIENT)
Dept: REHABILITATION | Age: 56
End: 2021-12-02
Attending: NEUROLOGICAL SURGERY

## 2022-07-07 DIAGNOSIS — M54.50 LOW BACK PAIN: Primary | ICD-10-CM

## 2022-07-07 DIAGNOSIS — M53.3 SACRO ILIAL PAIN: ICD-10-CM

## 2022-07-20 ENCOUNTER — HOSPITAL ENCOUNTER (OUTPATIENT)
Dept: REHABILITATION | Age: 57
Discharge: STILL ADMITTED OR ADMINISTRATIVE | End: 2022-07-20
Attending: NEUROLOGICAL SURGERY

## 2022-07-20 PROCEDURE — 10004173 HB COUNTER-THERAPY VISIT PT

## 2022-07-20 PROCEDURE — 97162 PT EVAL MOD COMPLEX 30 MIN: CPT

## 2022-07-20 ASSESSMENT — ENCOUNTER SYMPTOMS
PAIN SCALE AT HIGHEST: 10
QUALITY: ACHE
PAIN SEVERITY NOW: 6
QUALITY: RADIATING

## 2022-07-22 ENCOUNTER — HOSPITAL ENCOUNTER (OUTPATIENT)
Dept: REHABILITATION | Age: 57
Discharge: STILL ADMITTED OR ADMINISTRATIVE | End: 2022-07-22
Attending: NEUROLOGICAL SURGERY

## 2022-07-22 PROCEDURE — 97112 NEUROMUSCULAR REEDUCATION: CPT | Performed by: PHYSICAL THERAPY ASSISTANT

## 2022-07-22 PROCEDURE — 97110 THERAPEUTIC EXERCISES: CPT | Performed by: PHYSICAL THERAPY ASSISTANT

## 2022-07-22 PROCEDURE — 10004173 HB COUNTER-THERAPY VISIT PT: Performed by: PHYSICAL THERAPY ASSISTANT

## 2022-07-22 ASSESSMENT — ENCOUNTER SYMPTOMS: PAIN SEVERITY NOW: 7

## 2022-07-25 ENCOUNTER — HOSPITAL ENCOUNTER (OUTPATIENT)
Dept: REHABILITATION | Age: 57
Discharge: STILL ADMITTED OR ADMINISTRATIVE | End: 2022-07-25
Attending: NEUROLOGICAL SURGERY

## 2022-07-25 PROCEDURE — 10004173 HB COUNTER-THERAPY VISIT PT

## 2022-07-25 PROCEDURE — 97110 THERAPEUTIC EXERCISES: CPT

## 2022-07-25 ASSESSMENT — ENCOUNTER SYMPTOMS: PAIN SEVERITY NOW: 5

## 2022-07-28 ENCOUNTER — HOSPITAL ENCOUNTER (OUTPATIENT)
Dept: REHABILITATION | Age: 57
Discharge: STILL ADMITTED OR ADMINISTRATIVE | End: 2022-07-28
Attending: NEUROLOGICAL SURGERY

## 2022-07-28 PROCEDURE — 97112 NEUROMUSCULAR REEDUCATION: CPT | Performed by: PHYSICAL THERAPY ASSISTANT

## 2022-07-28 PROCEDURE — 10004173 HB COUNTER-THERAPY VISIT PT: Performed by: PHYSICAL THERAPY ASSISTANT

## 2022-07-28 PROCEDURE — 97110 THERAPEUTIC EXERCISES: CPT | Performed by: PHYSICAL THERAPY ASSISTANT

## 2022-07-28 ASSESSMENT — ENCOUNTER SYMPTOMS: PAIN SEVERITY NOW: 8

## 2022-08-01 ENCOUNTER — HOSPITAL ENCOUNTER (OUTPATIENT)
Dept: REHABILITATION | Age: 57
Discharge: STILL ADMITTED OR ADMINISTRATIVE | End: 2022-08-01
Attending: NEUROLOGICAL SURGERY

## 2022-08-01 PROCEDURE — 10004173 HB COUNTER-THERAPY VISIT PT

## 2022-08-01 PROCEDURE — 97110 THERAPEUTIC EXERCISES: CPT

## 2022-08-01 ASSESSMENT — ENCOUNTER SYMPTOMS: PAIN SEVERITY NOW: 8

## 2022-08-05 ENCOUNTER — APPOINTMENT (OUTPATIENT)
Dept: REHABILITATION | Age: 57
End: 2022-08-05
Attending: NEUROLOGICAL SURGERY

## 2022-08-10 ENCOUNTER — HOSPITAL ENCOUNTER (OUTPATIENT)
Dept: REHABILITATION | Age: 57
Discharge: STILL ADMITTED OR ADMINISTRATIVE | End: 2022-08-10
Attending: NEUROLOGICAL SURGERY

## 2022-08-10 PROCEDURE — 97110 THERAPEUTIC EXERCISES: CPT | Performed by: PHYSICAL THERAPY ASSISTANT

## 2022-08-10 PROCEDURE — 97530 THERAPEUTIC ACTIVITIES: CPT | Performed by: PHYSICAL THERAPY ASSISTANT

## 2022-08-10 PROCEDURE — 10004173 HB COUNTER-THERAPY VISIT PT: Performed by: PHYSICAL THERAPY ASSISTANT

## 2022-08-10 ASSESSMENT — ENCOUNTER SYMPTOMS: PAIN SEVERITY NOW: 4

## 2022-08-12 ENCOUNTER — APPOINTMENT (OUTPATIENT)
Dept: REHABILITATION | Age: 57
End: 2022-08-12
Attending: NEUROLOGICAL SURGERY

## 2022-08-15 ENCOUNTER — APPOINTMENT (OUTPATIENT)
Dept: REHABILITATION | Age: 57
End: 2022-08-15
Attending: NEUROLOGICAL SURGERY

## 2022-08-17 ENCOUNTER — APPOINTMENT (OUTPATIENT)
Dept: REHABILITATION | Age: 57
End: 2022-08-17
Attending: NEUROLOGICAL SURGERY

## 2022-08-22 ENCOUNTER — APPOINTMENT (OUTPATIENT)
Dept: REHABILITATION | Age: 57
End: 2022-08-22
Attending: NEUROLOGICAL SURGERY

## 2022-08-25 ENCOUNTER — APPOINTMENT (OUTPATIENT)
Dept: REHABILITATION | Age: 57
End: 2022-08-25
Attending: NEUROLOGICAL SURGERY

## 2022-08-29 ENCOUNTER — HOSPITAL ENCOUNTER (OUTPATIENT)
Dept: REHABILITATION | Age: 57
Discharge: STILL ADMITTED OR ADMINISTRATIVE | End: 2022-08-29
Attending: NEUROLOGICAL SURGERY

## 2022-08-29 PROCEDURE — 97110 THERAPEUTIC EXERCISES: CPT

## 2022-08-29 PROCEDURE — 10004173 HB COUNTER-THERAPY VISIT PT

## 2022-08-29 ASSESSMENT — ENCOUNTER SYMPTOMS: PAIN SEVERITY NOW: 6

## 2022-09-02 ENCOUNTER — APPOINTMENT (OUTPATIENT)
Dept: REHABILITATION | Age: 57
End: 2022-09-02
Attending: NEUROLOGICAL SURGERY

## 2022-09-08 ENCOUNTER — APPOINTMENT (OUTPATIENT)
Dept: REHABILITATION | Age: 57
End: 2022-09-08
Attending: NEUROLOGICAL SURGERY

## 2023-04-27 PROBLEM — M54.50 BILATERAL LOW BACK PAIN: Status: ACTIVE | Noted: 2022-02-17

## 2023-04-27 PROBLEM — M54.16 LUMBAR RADICULOPATHY: Status: ACTIVE | Noted: 2022-02-17

## 2023-04-27 PROBLEM — M53.3 SI (SACROILIAC) JOINT DYSFUNCTION: Status: ACTIVE | Noted: 2022-02-22

## 2023-04-27 PROBLEM — F17.200 SMOKING: Status: ACTIVE | Noted: 2022-05-09

## 2023-04-27 PROBLEM — Z98.1 S/P LUMBAR FUSION: Status: ACTIVE | Noted: 2022-02-22

## 2023-04-27 RX ORDER — ATORVASTATIN CALCIUM 20 MG/1
20 TABLET, FILM COATED ORAL
COMMUNITY

## 2023-04-27 RX ORDER — TAMSULOSIN HYDROCHLORIDE 0.4 MG/1
0.4 CAPSULE ORAL DAILY
COMMUNITY

## 2023-04-27 RX ORDER — LEVOTHYROXINE SODIUM 0.12 MG/1
125 TABLET ORAL
COMMUNITY

## 2023-12-20 DIAGNOSIS — M54.16 LUMBAR RADICULOPATHY: Primary | ICD-10-CM

## 2024-01-08 ENCOUNTER — APPOINTMENT (OUTPATIENT)
Dept: FAMILY MEDICINE | Age: 59
End: 2024-01-08

## 2024-01-08 VITALS
OXYGEN SATURATION: 96 % | WEIGHT: 183.4 LBS | SYSTOLIC BLOOD PRESSURE: 149 MMHG | HEIGHT: 68 IN | BODY MASS INDEX: 27.8 KG/M2 | HEART RATE: 80 BPM | TEMPERATURE: 97.9 F | DIASTOLIC BLOOD PRESSURE: 80 MMHG

## 2024-01-08 DIAGNOSIS — F11.20 OPIOID USE DISORDER, SEVERE, DEPENDENCE (CMD): Primary | ICD-10-CM

## 2024-01-08 PROBLEM — T81.9XXA COMPLICATION OF SURGICAL PROCEDURE: Status: ACTIVE | Noted: 2023-04-03

## 2024-01-08 LAB — LAB RESULT: NORMAL

## 2024-01-08 PROCEDURE — 99204 OFFICE O/P NEW MOD 45 MIN: CPT

## 2024-01-08 RX ORDER — BUPRENORPHINE AND NALOXONE 8; 2 MG/1; MG/1
1 FILM, SOLUBLE BUCCAL; SUBLINGUAL EVERY 12 HOURS SCHEDULED
Qty: 14 EACH | Refills: 0 | Status: SHIPPED | OUTPATIENT
Start: 2024-01-08

## 2024-01-08 ASSESSMENT — ENCOUNTER SYMPTOMS
NAUSEA: 1
HEADACHES: 1
ABDOMINAL PAIN: 0
SHORTNESS OF BREATH: 0
LIGHT-HEADEDNESS: 0
DIARRHEA: 1
CHILLS: 1
NERVOUS/ANXIOUS: 1
RHINORRHEA: 1
FATIGUE: 1
FEVER: 0
TREMORS: 0
DIZZINESS: 0

## 2024-01-08 ASSESSMENT — PATIENT HEALTH QUESTIONNAIRE - PHQ9
SUM OF ALL RESPONSES TO PHQ9 QUESTIONS 1 AND 2: 0
2. FEELING DOWN, DEPRESSED OR HOPELESS: NOT AT ALL
1. LITTLE INTEREST OR PLEASURE IN DOING THINGS: NOT AT ALL
CLINICAL INTERPRETATION OF PHQ2 SCORE: NO FURTHER SCREENING NEEDED
SUM OF ALL RESPONSES TO PHQ9 QUESTIONS 1 AND 2: 0

## 2024-01-12 ENCOUNTER — APPOINTMENT (OUTPATIENT)
Dept: CT IMAGING | Age: 59
End: 2024-01-12
Attending: NEUROLOGICAL SURGERY

## 2024-01-15 ENCOUNTER — APPOINTMENT (OUTPATIENT)
Dept: FAMILY MEDICINE | Age: 59
End: 2024-01-15

## 2024-01-15 VITALS
SYSTOLIC BLOOD PRESSURE: 136 MMHG | WEIGHT: 184.3 LBS | HEART RATE: 68 BPM | TEMPERATURE: 97.6 F | HEIGHT: 68 IN | DIASTOLIC BLOOD PRESSURE: 81 MMHG | BODY MASS INDEX: 27.93 KG/M2 | OXYGEN SATURATION: 96 %

## 2024-01-15 DIAGNOSIS — F11.20 OPIOID USE DISORDER, SEVERE, DEPENDENCE (CMD): ICD-10-CM

## 2024-01-15 LAB — LAB RESULT: NORMAL

## 2024-01-15 PROCEDURE — 99213 OFFICE O/P EST LOW 20 MIN: CPT

## 2024-01-15 RX ORDER — PREGABALIN 100 MG/1
100 CAPSULE ORAL 3 TIMES DAILY
COMMUNITY
Start: 2024-01-08 | End: 2024-04-07

## 2024-01-15 RX ORDER — BUPRENORPHINE AND NALOXONE 8; 2 MG/1; MG/1
1 FILM, SOLUBLE BUCCAL; SUBLINGUAL EVERY 12 HOURS SCHEDULED
Qty: 14 EACH | Refills: 0 | Status: SHIPPED | OUTPATIENT
Start: 2024-01-15

## 2024-01-15 RX ORDER — METHOCARBAMOL 750 MG/1
750 TABLET, FILM COATED ORAL 4 TIMES DAILY
COMMUNITY
Start: 2023-12-08

## 2024-01-15 ASSESSMENT — ENCOUNTER SYMPTOMS
FEVER: 0
CONSTIPATION: 0
TREMORS: 0
SHORTNESS OF BREATH: 0
FATIGUE: 0
NERVOUS/ANXIOUS: 0
CHILLS: 0
HEADACHES: 0
ABDOMINAL PAIN: 0
DIARRHEA: 0
NAUSEA: 0

## 2024-01-15 ASSESSMENT — PATIENT HEALTH QUESTIONNAIRE - PHQ9
2. FEELING DOWN, DEPRESSED OR HOPELESS: NOT AT ALL
CLINICAL INTERPRETATION OF PHQ2 SCORE: NO FURTHER SCREENING NEEDED
SUM OF ALL RESPONSES TO PHQ9 QUESTIONS 1 AND 2: 0
1. LITTLE INTEREST OR PLEASURE IN DOING THINGS: NOT AT ALL
SUM OF ALL RESPONSES TO PHQ9 QUESTIONS 1 AND 2: 0

## 2024-01-17 ENCOUNTER — APPOINTMENT (OUTPATIENT)
Dept: CT IMAGING | Age: 59
End: 2024-01-17
Attending: NEUROLOGICAL SURGERY

## 2024-01-18 ENCOUNTER — HOSPITAL ENCOUNTER (OUTPATIENT)
Dept: CT IMAGING | Age: 59
Discharge: HOME | End: 2024-01-18
Attending: NEUROLOGICAL SURGERY

## 2024-01-18 DIAGNOSIS — M54.16 LUMBAR RADICULOPATHY: ICD-10-CM

## 2024-01-18 PROCEDURE — 72131 CT LUMBAR SPINE W/O DYE: CPT

## 2024-01-18 PROCEDURE — 72131 CT LUMBAR SPINE W/O DYE: CPT | Performed by: RADIOLOGY

## 2024-01-22 ENCOUNTER — TELEPHONE (OUTPATIENT)
Dept: FAMILY MEDICINE | Age: 59
End: 2024-01-22

## 2024-01-22 ENCOUNTER — APPOINTMENT (OUTPATIENT)
Dept: FAMILY MEDICINE | Age: 59
End: 2024-01-22

## 2024-02-16 RX ORDER — HYDROCODONE BITARTRATE AND ACETAMINOPHEN 5; 325 MG/1; MG/1
1 TABLET ORAL EVERY 6 HOURS PRN
Status: ON HOLD | COMMUNITY
Start: 2024-02-09 | End: 2024-02-28 | Stop reason: HOSPADM

## 2024-02-16 RX ORDER — OXYCODONE AND ACETAMINOPHEN 10; 325 MG/1; MG/1
1 TABLET ORAL EVERY 4 HOURS PRN
COMMUNITY
Start: 2024-01-21 | End: 2024-02-28 | Stop reason: HOSPADM

## 2024-02-19 ENCOUNTER — EXTERNAL LAB (OUTPATIENT)
Dept: HEALTH INFORMATION MANAGEMENT | Facility: OTHER | Age: 59
End: 2024-02-19

## 2024-02-19 LAB — LAB RESULT: NORMAL

## 2024-02-20 RX ORDER — VARENICLINE TARTRATE 1 MG/1
1 TABLET, FILM COATED ORAL 2 TIMES DAILY
COMMUNITY

## 2024-02-20 ASSESSMENT — COGNITIVE AND FUNCTIONAL STATUS - GENERAL
ARE YOU BLIND OR DO YOU HAVE SERIOUS DIFFICULTY SEEING, EVEN WHEN WEARING GLASSES: NO
ARE YOU DEAF OR DO YOU HAVE SERIOUS DIFFICULTY  HEARING: NO

## 2024-02-20 ASSESSMENT — ACTIVITIES OF DAILY LIVING (ADL)
ADL_BEFORE_ADMISSION: INDEPENDENT
ADL_SCORE: 12
RECENT_DECLINE_ADL: NO
NEEDS_ASSIST: NO
ADL_SHORT_OF_BREATH: NO
SENSORY_SUPPORT_DEVICES: EYEGLASSES
HISTORY OF FALLING IN THE LAST YEAR (PRIOR TO ADMISSION): NO
CHRONIC_PAIN_PRESENT: YES, CHRONIC
DESCRIBE HOW PAIN IMPACTS YOUR LIFE: MOBILITY;PHYSICAL ACTIVITY;PERSONAL CARE/HOUSEHOLD CHORES

## 2024-02-23 ENCOUNTER — ANESTHESIA EVENT (OUTPATIENT)
Dept: SURGERY | Age: 59
End: 2024-02-23

## 2024-02-25 DIAGNOSIS — Z51.89 ENCOUNTER FOR THERAPEUTIC PROCEDURE: Primary | ICD-10-CM

## 2024-02-26 ENCOUNTER — HOSPITAL ENCOUNTER (OUTPATIENT)
Dept: GENERAL RADIOLOGY | Age: 59
Discharge: HOME | End: 2024-02-26
Attending: ORTHOPAEDIC SURGERY

## 2024-02-26 ENCOUNTER — ANESTHESIA (OUTPATIENT)
Dept: SURGERY | Age: 59
End: 2024-02-26

## 2024-02-26 ENCOUNTER — HOSPITAL ENCOUNTER (INPATIENT)
Age: 59
LOS: 3 days | Discharge: HOME | DRG: 453 | End: 2024-02-29
Attending: ORTHOPAEDIC SURGERY | Admitting: ORTHOPAEDIC SURGERY

## 2024-02-26 DIAGNOSIS — M48.062 LUMBAR STENOSIS WITH NEUROGENIC CLAUDICATION: Primary | ICD-10-CM

## 2024-02-26 DIAGNOSIS — Z51.89 ENCOUNTER FOR THERAPEUTIC PROCEDURE: ICD-10-CM

## 2024-02-26 LAB
ABO + RH BLD: NORMAL
ANION GAP BLD CALC-SCNC: 6 MMOL/L (ref 7–19)
BASE EXCESS BLDA CALC-SCNC: 0 MMOL/L (ref -2–3)
BLD GP AB SCN SERPL QL GEL: NEGATIVE
CA-I BLD-SCNC: 1.17 MMOL/L (ref 1.15–1.29)
CHLORIDE BLD-SCNC: 109 MMOL/L (ref 97–110)
GLUCOSE BLD-MCNC: 112 MG/DL (ref 70–99)
HCO3 BLDA-SCNC: 24 MMOL/L (ref 22–28)
HCT VFR BLD CALC: 42.1 % (ref 39–51)
HGB BLD CALC-MCNC: 13.7 G/DL (ref 13–17)
PCO2 BLDA: 39 MM HG (ref 35–48)
PH BLDA: 7.4 UNITS (ref 7.35–7.45)
PO2 BLDA: 126 MM HG (ref 83–108)
POTASSIUM BLD-SCNC: 4.1 MMOL/L (ref 3.4–5.1)
RAINBOW EXTRA TUBES HOLD SPECIMEN: NORMAL
SAO2 % BLDA: 100 % (ref 95–99)
SODIUM BLD-SCNC: 139 MMOL/L (ref 135–145)
TYPE AND SCREEN EXPIRATION DATE: NORMAL

## 2024-02-26 PROCEDURE — 10006023 HB SUPPLY 272: Performed by: ORTHOPAEDIC SURGERY

## 2024-02-26 PROCEDURE — 10002803 HB RX 637

## 2024-02-26 PROCEDURE — P9045 ALBUMIN (HUMAN), 5%, 250 ML: HCPCS | Performed by: ANESTHESIOLOGY

## 2024-02-26 PROCEDURE — 10002801 HB RX 250 W/O HCPCS: Performed by: ORTHOPAEDIC SURGERY

## 2024-02-26 PROCEDURE — 10002800 HB RX 250 W HCPCS: Performed by: PHYSICIAN ASSISTANT

## 2024-02-26 PROCEDURE — 86850 RBC ANTIBODY SCREEN: CPT | Performed by: ORTHOPAEDIC SURGERY

## 2024-02-26 PROCEDURE — 0QP004Z REMOVAL OF INTERNAL FIXATION DEVICE FROM LUMBAR VERTEBRA, OPEN APPROACH: ICD-10-PCS | Performed by: ORTHOPAEDIC SURGERY

## 2024-02-26 PROCEDURE — 10002807 HB RX 258: Performed by: ANESTHESIOLOGY

## 2024-02-26 PROCEDURE — 10004452 HB PACU ADDL 30 MINUTES: Performed by: ORTHOPAEDIC SURGERY

## 2024-02-26 PROCEDURE — 10005281 FL INTRAOPERATIVE C ARM NO REPORT

## 2024-02-26 PROCEDURE — 0SG00AJ FUSION OF LUMBAR VERTEBRAL JOINT WITH INTERBODY FUSION DEVICE, POSTERIOR APPROACH, ANTERIOR COLUMN, OPEN APPROACH: ICD-10-PCS | Performed by: ORTHOPAEDIC SURGERY

## 2024-02-26 PROCEDURE — 10004316 HB COUNTER-PREP

## 2024-02-26 PROCEDURE — 10002803 HB RX 637: Performed by: PHYSICIAN ASSISTANT

## 2024-02-26 PROCEDURE — 10002800 HB RX 250 W HCPCS: Performed by: ORTHOPAEDIC SURGERY

## 2024-02-26 PROCEDURE — 10004571 HB COUNTER-HOLDING 30 MIN

## 2024-02-26 PROCEDURE — 85018 HEMOGLOBIN: CPT

## 2024-02-26 PROCEDURE — 0QP104Z REMOVAL OF INTERNAL FIXATION DEVICE FROM SACRUM, OPEN APPROACH: ICD-10-PCS | Performed by: ORTHOPAEDIC SURGERY

## 2024-02-26 PROCEDURE — 97530 THERAPEUTIC ACTIVITIES: CPT

## 2024-02-26 PROCEDURE — 95870 NDL EMG LMTD STD MUSC 1 XTR: CPT

## 2024-02-26 PROCEDURE — 0ST20ZZ RESECTION OF LUMBAR VERTEBRAL DISC, OPEN APPROACH: ICD-10-PCS | Performed by: ORTHOPAEDIC SURGERY

## 2024-02-26 PROCEDURE — 10002359 HB ANESTH GENERAL ADD'L 1/2 HR: Performed by: ORTHOPAEDIC SURGERY

## 2024-02-26 PROCEDURE — C1762 CONN TISS, HUMAN(INC FASCIA): HCPCS | Performed by: ORTHOPAEDIC SURGERY

## 2024-02-26 PROCEDURE — 99221 1ST HOSP IP/OBS SF/LOW 40: CPT | Performed by: HOSPITALIST

## 2024-02-26 PROCEDURE — S0310 HOSPITALIST VISIT: HCPCS | Performed by: PHYSICIAN ASSISTANT

## 2024-02-26 PROCEDURE — 10002800 HB RX 250 W HCPCS: Performed by: ANESTHESIOLOGY

## 2024-02-26 PROCEDURE — 0SG00K1 FUSION OF LUMBAR VERTEBRAL JOINT WITH NONAUTOLOGOUS TISSUE SUBSTITUTE, POSTERIOR APPROACH, POSTERIOR COLUMN, OPEN APPROACH: ICD-10-PCS | Performed by: ORTHOPAEDIC SURGERY

## 2024-02-26 PROCEDURE — 10002800 HB RX 250 W HCPCS

## 2024-02-26 PROCEDURE — 10006400 HB NEURO MAJOR COMPLEX: Performed by: ORTHOPAEDIC SURGERY

## 2024-02-26 PROCEDURE — 10002117 HB ADDITIONAL SURGERY TIME/30 MIN: Performed by: ORTHOPAEDIC SURGERY

## 2024-02-26 PROCEDURE — 10000002 HB ROOM CHARGE MED SURG

## 2024-02-26 PROCEDURE — 10006027 HB SUPPLY 278: Performed by: ORTHOPAEDIC SURGERY

## 2024-02-26 PROCEDURE — C1713 ANCHOR/SCREW BN/BN,TIS/BN: HCPCS | Performed by: ORTHOPAEDIC SURGERY

## 2024-02-26 PROCEDURE — 00NY0ZZ RELEASE LUMBAR SPINAL CORD, OPEN APPROACH: ICD-10-PCS | Performed by: ORTHOPAEDIC SURGERY

## 2024-02-26 PROCEDURE — 95938 SOMATOSENSORY TESTING: CPT

## 2024-02-26 PROCEDURE — 0SP004Z REMOVAL OF INTERNAL FIXATION DEVICE FROM LUMBAR VERTEBRAL JOINT, OPEN APPROACH: ICD-10-PCS | Performed by: ORTHOPAEDIC SURGERY

## 2024-02-26 PROCEDURE — 01NB0ZZ RELEASE LUMBAR NERVE, OPEN APPROACH: ICD-10-PCS | Performed by: ORTHOPAEDIC SURGERY

## 2024-02-26 PROCEDURE — 80051 ELECTROLYTE PANEL: CPT

## 2024-02-26 PROCEDURE — 82330 ASSAY OF CALCIUM: CPT

## 2024-02-26 PROCEDURE — 0SP304Z REMOVAL OF INTERNAL FIXATION DEVICE FROM LUMBOSACRAL JOINT, OPEN APPROACH: ICD-10-PCS | Performed by: ORTHOPAEDIC SURGERY

## 2024-02-26 PROCEDURE — C1763 CONN TISS, NON-HUMAN: HCPCS | Performed by: ORTHOPAEDIC SURGERY

## 2024-02-26 PROCEDURE — 97161 PT EVAL LOW COMPLEX 20 MIN: CPT

## 2024-02-26 PROCEDURE — 82803 BLOOD GASES ANY COMBINATION: CPT

## 2024-02-26 PROCEDURE — 10002801 HB RX 250 W/O HCPCS: Performed by: ANESTHESIOLOGY

## 2024-02-26 PROCEDURE — 85014 HEMATOCRIT: CPT

## 2024-02-26 PROCEDURE — 10004451 HB PACU RECOVERY 1ST 30 MINUTES: Performed by: ORTHOPAEDIC SURGERY

## 2024-02-26 PROCEDURE — 10002358 HB ANESTH GENERAL 1ST 1/2 HR: Performed by: ORTHOPAEDIC SURGERY

## 2024-02-26 PROCEDURE — 10004173 HB COUNTER-THERAPY VISIT PT

## 2024-02-26 PROCEDURE — 82947 ASSAY GLUCOSE BLOOD QUANT: CPT

## 2024-02-26 DEVICE — ROD SPNL 5.5MM 45MM CRV MOMENTUM TI NS LF: Type: IMPLANTABLE DEVICE | Site: SPINE LUMBAR | Status: FUNCTIONAL

## 2024-02-26 DEVICE — SCREW BN 6.5MM 45MM MOMENTUM POLYAXIAL NS SPINE LF: Type: IMPLANTABLE DEVICE | Site: SPINE LUMBAR | Status: FUNCTIONAL

## 2024-02-26 DEVICE — IMPLANTABLE DEVICE
Type: IMPLANTABLE DEVICE | Site: SPINE LUMBAR | Status: FUNCTIONAL
Brand: SURGIFOAM® ABSORBABLE GELATIN SPONGE, U.S.P.

## 2024-02-26 DEVICE — DURAGEN® PLUS DURAL REGENERATION MATRIX , 4 IN X 5 IN
Type: IMPLANTABLE DEVICE | Site: SPINE LUMBAR | Status: FUNCTIONAL
Brand: DURAGEN® PLUS

## 2024-02-26 DEVICE — DBM T43110 10CC GRAFTON PUTTY
Type: IMPLANTABLE DEVICE | Site: BACK | Status: FUNCTIONAL
Brand: GRAFTON®AND GRAFTON PLUS®DEMINERALIZED BONE MATRIX (DBM)

## 2024-02-26 DEVICE — GRAFT 1-8MM CANC ALLOGRAFT CHIP BN: Type: IMPLANTABLE DEVICE | Site: BACK | Status: FUNCTIONAL

## 2024-02-26 DEVICE — TEZO-P, HEIGHT 13 MM, LENGTH 29 MM,  ANGLE 5°
Type: IMPLANTABLE DEVICE | Site: BACK | Status: FUNCTIONAL
Brand: TEZO-P™ TITANIUM CAGE FAMILY

## 2024-02-26 DEVICE — SCREW BN MOMENTUM LOCK NS SPINE LF: Type: IMPLANTABLE DEVICE | Site: SPINE LUMBAR | Status: FUNCTIONAL

## 2024-02-26 DEVICE — SCREW BN 6.5MM 50MM MOMENTUM POLYAXIAL NS SPINE LF: Type: IMPLANTABLE DEVICE | Site: SPINE LUMBAR | Status: FUNCTIONAL

## 2024-02-26 DEVICE — FLOSEAL WITH RECOTHROM - 10ML.
Type: IMPLANTABLE DEVICE | Site: BACK | Status: FUNCTIONAL
Brand: FLOSEAL HEMOSTATIC MATRIX

## 2024-02-26 DEVICE — IMPLANTABLE DEVICE: Type: IMPLANTABLE DEVICE | Site: SPINE LUMBAR | Status: FUNCTIONAL

## 2024-02-26 DEVICE — IMPLANTABLE DEVICE
Type: IMPLANTABLE DEVICE | Site: BACK | Status: FUNCTIONAL
Brand: SURGIFOAM® ABSORBABLE GELATIN SPONGE, U.S.P.

## 2024-02-26 RX ORDER — HUMAN INSULIN 100 [IU]/ML
INJECTION, SOLUTION SUBCUTANEOUS
Status: DISCONTINUED | OUTPATIENT
Start: 2024-02-26 | End: 2024-02-26 | Stop reason: HOSPADM

## 2024-02-26 RX ORDER — DEXTROSE MONOHYDRATE 25 G/50ML
25 INJECTION, SOLUTION INTRAVENOUS PRN
Status: DISCONTINUED | OUTPATIENT
Start: 2024-02-26 | End: 2024-02-26 | Stop reason: HOSPADM

## 2024-02-26 RX ORDER — HYDRALAZINE HYDROCHLORIDE 20 MG/ML
5 INJECTION INTRAMUSCULAR; INTRAVENOUS EVERY 10 MIN PRN
Status: DISCONTINUED | OUTPATIENT
Start: 2024-02-26 | End: 2024-02-26 | Stop reason: HOSPADM

## 2024-02-26 RX ORDER — NICOTINE POLACRILEX 4 MG
30 LOZENGE BUCCAL
Status: DISCONTINUED | OUTPATIENT
Start: 2024-02-26 | End: 2024-02-26 | Stop reason: HOSPADM

## 2024-02-26 RX ORDER — NICOTINE 21 MG/24HR
1 PATCH, TRANSDERMAL 24 HOURS TRANSDERMAL DAILY
Status: DISCONTINUED | OUTPATIENT
Start: 2024-02-26 | End: 2024-02-29 | Stop reason: HOSPADM

## 2024-02-26 RX ORDER — PREGABALIN 100 MG/1
100 CAPSULE ORAL 3 TIMES DAILY
Status: DISCONTINUED | OUTPATIENT
Start: 2024-02-26 | End: 2024-02-29 | Stop reason: HOSPADM

## 2024-02-26 RX ORDER — SODIUM CHLORIDE 9 MG/ML
INJECTION, SOLUTION INTRAVENOUS CONTINUOUS
Status: DISCONTINUED | OUTPATIENT
Start: 2024-02-26 | End: 2024-02-29 | Stop reason: HOSPADM

## 2024-02-26 RX ORDER — ONDANSETRON 2 MG/ML
INJECTION INTRAMUSCULAR; INTRAVENOUS PRN
Status: DISCONTINUED | OUTPATIENT
Start: 2024-02-26 | End: 2024-02-26

## 2024-02-26 RX ORDER — TRANEXAMIC ACID 10 MG/ML
INJECTION, SOLUTION INTRAVENOUS PRN
Status: DISCONTINUED | OUTPATIENT
Start: 2024-02-26 | End: 2024-02-26

## 2024-02-26 RX ORDER — DEXAMETHASONE SODIUM PHOSPHATE 4 MG/ML
INJECTION, SOLUTION INTRA-ARTICULAR; INTRALESIONAL; INTRAMUSCULAR; INTRAVENOUS; SOFT TISSUE PRN
Status: DISCONTINUED | OUTPATIENT
Start: 2024-02-26 | End: 2024-02-26

## 2024-02-26 RX ORDER — MIDAZOLAM HYDROCHLORIDE 1 MG/ML
2 INJECTION, SOLUTION INTRAMUSCULAR; INTRAVENOUS
Status: COMPLETED | OUTPATIENT
Start: 2024-02-26 | End: 2024-02-26

## 2024-02-26 RX ORDER — SODIUM CHLORIDE, SODIUM LACTATE, POTASSIUM CHLORIDE, CALCIUM CHLORIDE 600; 310; 30; 20 MG/100ML; MG/100ML; MG/100ML; MG/100ML
INJECTION, SOLUTION INTRAVENOUS CONTINUOUS
Status: DISCONTINUED | OUTPATIENT
Start: 2024-02-26 | End: 2024-02-29 | Stop reason: HOSPADM

## 2024-02-26 RX ORDER — PHENYLEPHRINE HYDROCHLORIDE 10 MG/ML
INJECTION, SOLUTION INTRAMUSCULAR; INTRAVENOUS; SUBCUTANEOUS PRN
Status: DISCONTINUED | OUTPATIENT
Start: 2024-02-26 | End: 2024-02-26

## 2024-02-26 RX ORDER — DIPHENHYDRAMINE HYDROCHLORIDE 50 MG/ML
25 INJECTION INTRAMUSCULAR; INTRAVENOUS EVERY 4 HOURS PRN
Status: DISCONTINUED | OUTPATIENT
Start: 2024-02-26 | End: 2024-02-26 | Stop reason: HOSPADM

## 2024-02-26 RX ORDER — ATORVASTATIN CALCIUM 20 MG/1
20 TABLET, FILM COATED ORAL DAILY
Status: DISCONTINUED | OUTPATIENT
Start: 2024-02-26 | End: 2024-02-29 | Stop reason: HOSPADM

## 2024-02-26 RX ORDER — KETAMINE HCL IN NACL, ISO-OSM 100MG/10ML
SYRINGE (ML) INJECTION PRN
Status: DISCONTINUED | OUTPATIENT
Start: 2024-02-26 | End: 2024-02-26

## 2024-02-26 RX ORDER — LIDOCAINE HYDROCHLORIDE 20 MG/ML
INJECTION, SOLUTION INFILTRATION; PERINEURAL PRN
Status: DISCONTINUED | OUTPATIENT
Start: 2024-02-26 | End: 2024-02-26

## 2024-02-26 RX ORDER — PROPOFOL 10 MG/ML
INJECTION, EMULSION INTRAVENOUS PRN
Status: DISCONTINUED | OUTPATIENT
Start: 2024-02-26 | End: 2024-02-26

## 2024-02-26 RX ORDER — NALOXONE HCL 0.4 MG/ML
0.2 VIAL (ML) INJECTION EVERY 5 MIN PRN
Status: DISCONTINUED | OUTPATIENT
Start: 2024-02-26 | End: 2024-02-26 | Stop reason: HOSPADM

## 2024-02-26 RX ORDER — CYCLOBENZAPRINE HCL 5 MG
5 TABLET ORAL 3 TIMES DAILY PRN
Status: DISCONTINUED | OUTPATIENT
Start: 2024-02-26 | End: 2024-02-27

## 2024-02-26 RX ORDER — LIDOCAINE HYDROCHLORIDE 10 MG/ML
5-10 INJECTION, SOLUTION INFILTRATION; PERINEURAL PRN
Status: DISCONTINUED | OUTPATIENT
Start: 2024-02-26 | End: 2024-02-26 | Stop reason: HOSPADM

## 2024-02-26 RX ORDER — HYDROMORPHONE HCL IN 0.9% NACL 0.2 MG/ML
PLASTIC BAG, INJECTION (ML) INTRAVENOUS CONTINUOUS
Status: DISCONTINUED | OUTPATIENT
Start: 2024-02-26 | End: 2024-02-27

## 2024-02-26 RX ORDER — BUPIVACAINE HYDROCHLORIDE 5 MG/ML
INJECTION, SOLUTION PERINEURAL PRN
Status: DISCONTINUED | OUTPATIENT
Start: 2024-02-26 | End: 2024-02-26 | Stop reason: HOSPADM

## 2024-02-26 RX ORDER — EPHEDRINE SULFATE/0.9% NACL/PF 50 MG/10ML
SYRINGE (ML) INTRAVENOUS PRN
Status: DISCONTINUED | OUTPATIENT
Start: 2024-02-26 | End: 2024-02-26

## 2024-02-26 RX ORDER — OXYCODONE HYDROCHLORIDE 5 MG/1
10 TABLET ORAL EVERY 4 HOURS PRN
Status: DISCONTINUED | OUTPATIENT
Start: 2024-02-26 | End: 2024-02-27

## 2024-02-26 RX ORDER — ROCURONIUM BROMIDE 10 MG/ML
INJECTION, SOLUTION INTRAVENOUS PRN
Status: DISCONTINUED | OUTPATIENT
Start: 2024-02-26 | End: 2024-02-26

## 2024-02-26 RX ORDER — CYCLOBENZAPRINE HCL 5 MG
TABLET ORAL
Status: COMPLETED
Start: 2024-02-26 | End: 2024-02-26

## 2024-02-26 RX ORDER — SODIUM CHLORIDE, SODIUM LACTATE, POTASSIUM CHLORIDE, CALCIUM CHLORIDE 600; 310; 30; 20 MG/100ML; MG/100ML; MG/100ML; MG/100ML
INJECTION, SOLUTION INTRAVENOUS CONTINUOUS
Status: DISCONTINUED | OUTPATIENT
Start: 2024-02-26 | End: 2024-02-27

## 2024-02-26 RX ORDER — DROPERIDOL 2.5 MG/ML
0.62 INJECTION, SOLUTION INTRAMUSCULAR; INTRAVENOUS
Status: DISCONTINUED | OUTPATIENT
Start: 2024-02-26 | End: 2024-02-26 | Stop reason: HOSPADM

## 2024-02-26 RX ORDER — HYDROMORPHONE HCL IN 0.9% NACL 0.2 MG/ML
PLASTIC BAG, INJECTION (ML) INTRAVENOUS
Status: COMPLETED
Start: 2024-02-26 | End: 2024-02-26

## 2024-02-26 RX ORDER — GENTAMICIN SULFATE 40 MG/ML
INJECTION, SOLUTION INTRAMUSCULAR; INTRAVENOUS PRN
Status: DISCONTINUED | OUTPATIENT
Start: 2024-02-26 | End: 2024-02-26 | Stop reason: HOSPADM

## 2024-02-26 RX ORDER — 0.9 % SODIUM CHLORIDE 0.9 %
2 VIAL (ML) INJECTION EVERY 12 HOURS SCHEDULED
Status: DISCONTINUED | OUTPATIENT
Start: 2024-02-26 | End: 2024-02-26 | Stop reason: HOSPADM

## 2024-02-26 RX ORDER — DEXTROSE MONOHYDRATE 50 MG/ML
INJECTION, SOLUTION INTRAVENOUS CONTINUOUS PRN
Status: DISCONTINUED | OUTPATIENT
Start: 2024-02-26 | End: 2024-02-29 | Stop reason: HOSPADM

## 2024-02-26 RX ORDER — SODIUM CHLORIDE, SODIUM LACTATE, POTASSIUM CHLORIDE, CALCIUM CHLORIDE 600; 310; 30; 20 MG/100ML; MG/100ML; MG/100ML; MG/100ML
INJECTION, SOLUTION INTRAVENOUS CONTINUOUS PRN
Status: DISCONTINUED | OUTPATIENT
Start: 2024-02-26 | End: 2024-02-26

## 2024-02-26 RX ORDER — LIDOCAINE HYDROCHLORIDE 40 MG/ML
SOLUTION TOPICAL PRN
Status: DISCONTINUED | OUTPATIENT
Start: 2024-02-26 | End: 2024-02-26

## 2024-02-26 RX ORDER — TAMSULOSIN HYDROCHLORIDE 0.4 MG/1
0.4 CAPSULE ORAL DAILY
Status: DISCONTINUED | OUTPATIENT
Start: 2024-02-26 | End: 2024-02-29 | Stop reason: HOSPADM

## 2024-02-26 RX ORDER — SODIUM CHLORIDE 9 MG/ML
INJECTION, SOLUTION INTRAVENOUS CONTINUOUS
Status: DISCONTINUED | OUTPATIENT
Start: 2024-02-26 | End: 2024-02-27

## 2024-02-26 RX ORDER — GLYCOPYRROLATE 0.2 MG/ML
INJECTION, SOLUTION INTRAMUSCULAR; INTRAVENOUS PRN
Status: DISCONTINUED | OUTPATIENT
Start: 2024-02-26 | End: 2024-02-26

## 2024-02-26 RX ORDER — ALBUMIN, HUMAN INJ 5% 5 %
SOLUTION INTRAVENOUS CONTINUOUS PRN
Status: DISCONTINUED | OUTPATIENT
Start: 2024-02-26 | End: 2024-02-26

## 2024-02-26 RX ORDER — PROCHLORPERAZINE EDISYLATE 5 MG/ML
5 INJECTION INTRAMUSCULAR; INTRAVENOUS EVERY 4 HOURS PRN
Status: DISCONTINUED | OUTPATIENT
Start: 2024-02-26 | End: 2024-02-26 | Stop reason: HOSPADM

## 2024-02-26 RX ADMIN — EPHEDRINE SULFATE 5 MG: 5 INJECTION INTRAVENOUS at 07:57

## 2024-02-26 RX ADMIN — PROPOFOL 160 MG: 10 INJECTION, EMULSION INTRAVENOUS at 06:47

## 2024-02-26 RX ADMIN — PROPOFOL INJECTABLE EMULSION 100 MCG/KG/MIN: 10 INJECTION, EMULSION INTRAVENOUS at 07:05

## 2024-02-26 RX ADMIN — FENTANYL CITRATE 25 MCG: 50 INJECTION INTRAMUSCULAR; INTRAVENOUS at 14:22

## 2024-02-26 RX ADMIN — HYDROMORPHONE HYDROCHLORIDE 0.4 MG: 1 INJECTION, SOLUTION INTRAMUSCULAR; INTRAVENOUS; SUBCUTANEOUS at 11:42

## 2024-02-26 RX ADMIN — GLYCOPYRROLATE 0.1 MG: 0.2 INJECTION INTRAMUSCULAR; INTRAVENOUS at 07:13

## 2024-02-26 RX ADMIN — ROCURONIUM BROMIDE 30 MG: 10 INJECTION INTRAVENOUS at 07:00

## 2024-02-26 RX ADMIN — DEXAMETHASONE SODIUM PHOSPHATE 8 MG: 4 INJECTION INTRA-ARTICULAR; INTRALESIONAL; INTRAMUSCULAR; INTRAVENOUS; SOFT TISSUE at 07:15

## 2024-02-26 RX ADMIN — EPHEDRINE SULFATE 5 MG: 5 INJECTION INTRAVENOUS at 09:54

## 2024-02-26 RX ADMIN — ALBUMIN HUMAN: 0.05 INJECTION, SOLUTION INTRAVENOUS at 07:44

## 2024-02-26 RX ADMIN — Medication 5 MG: at 12:57

## 2024-02-26 RX ADMIN — Medication: at 22:04

## 2024-02-26 RX ADMIN — ATORVASTATIN CALCIUM 20 MG: 20 TABLET, FILM COATED ORAL at 17:24

## 2024-02-26 RX ADMIN — FENTANYL CITRATE 25 MCG: 50 INJECTION INTRAMUSCULAR; INTRAVENOUS at 07:14

## 2024-02-26 RX ADMIN — LIDOCAINE HYDROCHLORIDE 1 APPLICATION: 40 SOLUTION TOPICAL at 06:47

## 2024-02-26 RX ADMIN — CYCLOBENZAPRINE HYDROCHLORIDE 5 MG: 5 TABLET, FILM COATED ORAL at 12:57

## 2024-02-26 RX ADMIN — HYDROMORPHONE HYDROCHLORIDE 0.4 MG: 1 INJECTION, SOLUTION INTRAMUSCULAR; INTRAVENOUS; SUBCUTANEOUS at 12:21

## 2024-02-26 RX ADMIN — OXYCODONE HYDROCHLORIDE 10 MG: 5 TABLET ORAL at 17:24

## 2024-02-26 RX ADMIN — ONDANSETRON 4 MG: 2 INJECTION INTRAMUSCULAR; INTRAVENOUS at 09:54

## 2024-02-26 RX ADMIN — FENTANYL CITRATE 25 MCG: 50 INJECTION INTRAMUSCULAR; INTRAVENOUS at 12:36

## 2024-02-26 RX ADMIN — Medication 10 MG: at 09:26

## 2024-02-26 RX ADMIN — PREGABALIN 100 MG: 100 CAPSULE ORAL at 22:16

## 2024-02-26 RX ADMIN — CEFAZOLIN SODIUM 2000 MG: 300 INJECTION, POWDER, LYOPHILIZED, FOR SOLUTION INTRAVENOUS at 09:57

## 2024-02-26 RX ADMIN — HYDROMORPHONE HYDROCHLORIDE 0.5 MG: 1 INJECTION, SOLUTION INTRAMUSCULAR; INTRAVENOUS; SUBCUTANEOUS at 10:11

## 2024-02-26 RX ADMIN — CEFAZOLIN SODIUM 2000 MG: 300 INJECTION, POWDER, LYOPHILIZED, FOR SOLUTION INTRAVENOUS at 16:30

## 2024-02-26 RX ADMIN — FENTANYL CITRATE 25 MCG: 50 INJECTION INTRAMUSCULAR; INTRAVENOUS at 13:10

## 2024-02-26 RX ADMIN — Medication: at 14:27

## 2024-02-26 RX ADMIN — CEFAZOLIN SODIUM 2000 MG: 300 INJECTION, POWDER, LYOPHILIZED, FOR SOLUTION INTRAVENOUS at 22:16

## 2024-02-26 RX ADMIN — FENTANYL CITRATE 50 MCG: 50 INJECTION INTRAMUSCULAR; INTRAVENOUS at 11:20

## 2024-02-26 RX ADMIN — SODIUM CHLORIDE, POTASSIUM CHLORIDE, SODIUM LACTATE AND CALCIUM CHLORIDE: 600; 310; 30; 20 INJECTION, SOLUTION INTRAVENOUS at 06:09

## 2024-02-26 RX ADMIN — FENTANYL CITRATE 25 MCG: 50 INJECTION INTRAMUSCULAR; INTRAVENOUS at 13:51

## 2024-02-26 RX ADMIN — Medication 1 PATCH: at 17:04

## 2024-02-26 RX ADMIN — FENTANYL CITRATE 50 MCG: 50 INJECTION INTRAMUSCULAR; INTRAVENOUS at 11:07

## 2024-02-26 RX ADMIN — FENTANYL CITRATE 25 MCG: 50 INJECTION INTRAMUSCULAR; INTRAVENOUS at 09:00

## 2024-02-26 RX ADMIN — Medication 10 MG: at 08:12

## 2024-02-26 RX ADMIN — EPHEDRINE SULFATE 10 MG: 5 INJECTION INTRAVENOUS at 08:14

## 2024-02-26 RX ADMIN — Medication 40 MG: at 07:13

## 2024-02-26 RX ADMIN — FENTANYL CITRATE 25 MCG: 50 INJECTION INTRAMUSCULAR; INTRAVENOUS at 06:47

## 2024-02-26 RX ADMIN — EPHEDRINE SULFATE 5 MG: 5 INJECTION INTRAVENOUS at 09:46

## 2024-02-26 RX ADMIN — HYDROMORPHONE HYDROCHLORIDE 0.5 MG: 1 INJECTION, SOLUTION INTRAMUSCULAR; INTRAVENOUS; SUBCUTANEOUS at 10:26

## 2024-02-26 RX ADMIN — ALBUMIN HUMAN: 0.05 INJECTION, SOLUTION INTRAVENOUS at 07:59

## 2024-02-26 RX ADMIN — FENTANYL CITRATE 50 MCG: 50 INJECTION INTRAMUSCULAR; INTRAVENOUS at 10:55

## 2024-02-26 RX ADMIN — SODIUM CHLORIDE, POTASSIUM CHLORIDE, SODIUM LACTATE AND CALCIUM CHLORIDE: 600; 310; 30; 20 INJECTION, SOLUTION INTRAVENOUS at 09:20

## 2024-02-26 RX ADMIN — HYDROMORPHONE HYDROCHLORIDE 0.4 MG: 1 INJECTION, SOLUTION INTRAMUSCULAR; INTRAVENOUS; SUBCUTANEOUS at 11:31

## 2024-02-26 RX ADMIN — Medication 80 MG: at 06:47

## 2024-02-26 RX ADMIN — LIDOCAINE HYDROCHLORIDE 3 ML: 20 INJECTION, SOLUTION INFILTRATION; PERINEURAL at 06:47

## 2024-02-26 RX ADMIN — MIDAZOLAM HYDROCHLORIDE 2 MG: 1 INJECTION, SOLUTION INTRAMUSCULAR; INTRAVENOUS at 06:36

## 2024-02-26 RX ADMIN — CEFAZOLIN SODIUM 2000 MG: 300 INJECTION, POWDER, LYOPHILIZED, FOR SOLUTION INTRAVENOUS at 07:00

## 2024-02-26 RX ADMIN — HYDROMORPHONE HYDROCHLORIDE 0.4 MG: 1 INJECTION, SOLUTION INTRAMUSCULAR; INTRAVENOUS; SUBCUTANEOUS at 11:54

## 2024-02-26 RX ADMIN — HYDROMORPHONE HYDROCHLORIDE 0.4 MG: 1 INJECTION, SOLUTION INTRAMUSCULAR; INTRAVENOUS; SUBCUTANEOUS at 12:10

## 2024-02-26 RX ADMIN — ROCURONIUM BROMIDE 20 MG: 10 INJECTION INTRAVENOUS at 07:26

## 2024-02-26 RX ADMIN — SUGAMMADEX 200 MG: 100 INJECTION, SOLUTION INTRAVENOUS at 08:06

## 2024-02-26 RX ADMIN — TRANEXAMIC ACID 1000 MG: 10 INJECTION, SOLUTION INTRAVENOUS at 07:14

## 2024-02-26 RX ADMIN — TAMSULOSIN HYDROCHLORIDE 0.4 MG: 0.4 CAPSULE ORAL at 17:24

## 2024-02-26 RX ADMIN — PHENYLEPHRINE HYDROCHLORIDE 100 MCG: 10 INJECTION INTRAVENOUS at 06:52

## 2024-02-26 RX ADMIN — OXYCODONE HYDROCHLORIDE 10 MG: 5 TABLET ORAL at 21:28

## 2024-02-26 ASSESSMENT — PAIN SCALES - GENERAL
PAINLEVEL_OUTOF10: 7
PAINLEVEL_OUTOF10: 8
PAINLEVEL_OUTOF10: 6
PAINLEVEL_OUTOF10: 3
PAINLEVEL_OUTOF10: 6
PAINLEVEL_OUTOF10: 6
PAINLEVEL_OUTOF10: 8
PAINLEVEL_OUTOF10: 6
PAINLEVEL_OUTOF10: 7
PAINLEVEL_OUTOF10: 7
PAINLEVEL_OUTOF10: 8
PAINLEVEL_OUTOF10: 8

## 2024-02-26 ASSESSMENT — COGNITIVE AND FUNCTIONAL STATUS - GENERAL
BASIC_MOBILITY_CONVERTED_SCORE: 41.05
BASIC_MOBILITY_RAW_SCORE: 18

## 2024-02-26 ASSESSMENT — ENCOUNTER SYMPTOMS: EXERCISE TOLERANCE: GOOD (>4 METS)

## 2024-02-27 ENCOUNTER — APPOINTMENT (OUTPATIENT)
Dept: GENERAL RADIOLOGY | Age: 59
DRG: 453 | End: 2024-02-27
Attending: PHYSICIAN ASSISTANT

## 2024-02-27 LAB
ANION GAP SERPL CALC-SCNC: 10 MMOL/L (ref 7–19)
BASOPHILS # BLD: 0 K/MCL (ref 0–0.3)
BASOPHILS NFR BLD: 0 %
BUN SERPL-MCNC: 10 MG/DL (ref 6–20)
BUN/CREAT SERPL: 15 (ref 7–25)
CALCIUM SERPL-MCNC: 8.8 MG/DL (ref 8.4–10.2)
CHLORIDE SERPL-SCNC: 109 MMOL/L (ref 97–110)
CO2 SERPL-SCNC: 25 MMOL/L (ref 21–32)
CREAT SERPL-MCNC: 0.67 MG/DL (ref 0.67–1.17)
DEPRECATED RDW RBC: 46.2 FL (ref 39–50)
EGFRCR SERPLBLD CKD-EPI 2021: >90 ML/MIN/{1.73_M2}
EOSINOPHIL # BLD: 0.1 K/MCL (ref 0–0.5)
EOSINOPHIL NFR BLD: 1 %
ERYTHROCYTE [DISTWIDTH] IN BLOOD: 12.8 % (ref 11–15)
FASTING DURATION TIME PATIENT: ABNORMAL H
GLUCOSE SERPL-MCNC: 114 MG/DL (ref 70–99)
HCT VFR BLD CALC: 34.7 % (ref 39–51)
HGB BLD-MCNC: 11.3 G/DL (ref 13–17)
IMM GRANULOCYTES # BLD AUTO: 0 K/MCL (ref 0–0.2)
IMM GRANULOCYTES # BLD: 0 %
LYMPHOCYTES # BLD: 1.2 K/MCL (ref 1–4)
LYMPHOCYTES NFR BLD: 10 %
MCH RBC QN AUTO: 32.2 PG (ref 26–34)
MCHC RBC AUTO-ENTMCNC: 32.6 G/DL (ref 32–36.5)
MCV RBC AUTO: 98.9 FL (ref 78–100)
MONOCYTES # BLD: 1 K/MCL (ref 0.3–0.9)
MONOCYTES NFR BLD: 9 %
NEUTROPHILS # BLD: 9.2 K/MCL (ref 1.8–7.7)
NEUTROPHILS NFR BLD: 80 %
NRBC BLD MANUAL-RTO: 0 /100 WBC
PLATELET # BLD AUTO: 175 K/MCL (ref 140–450)
POTASSIUM SERPL-SCNC: 4 MMOL/L (ref 3.4–5.1)
RBC # BLD: 3.51 MIL/MCL (ref 4.5–5.9)
SODIUM SERPL-SCNC: 140 MMOL/L (ref 135–145)
WBC # BLD: 11.5 K/MCL (ref 4.2–11)

## 2024-02-27 PROCEDURE — 10000002 HB ROOM CHARGE MED SURG

## 2024-02-27 PROCEDURE — 99232 SBSQ HOSP IP/OBS MODERATE 35: CPT | Performed by: PHYSICIAN ASSISTANT

## 2024-02-27 PROCEDURE — 10002803 HB RX 637: Performed by: PHYSICIAN ASSISTANT

## 2024-02-27 PROCEDURE — 10002803 HB RX 637: Performed by: HOSPITALIST

## 2024-02-27 PROCEDURE — 97530 THERAPEUTIC ACTIVITIES: CPT

## 2024-02-27 PROCEDURE — 10002800 HB RX 250 W HCPCS: Performed by: PHYSICIAN ASSISTANT

## 2024-02-27 PROCEDURE — 36415 COLL VENOUS BLD VENIPUNCTURE: CPT | Performed by: PHYSICIAN ASSISTANT

## 2024-02-27 PROCEDURE — 72100 X-RAY EXAM L-S SPINE 2/3 VWS: CPT

## 2024-02-27 PROCEDURE — 10004173 HB COUNTER-THERAPY VISIT PT

## 2024-02-27 PROCEDURE — 10002807 HB RX 258: Performed by: PHYSICIAN ASSISTANT

## 2024-02-27 PROCEDURE — 97535 SELF CARE MNGMENT TRAINING: CPT

## 2024-02-27 PROCEDURE — 97166 OT EVAL MOD COMPLEX 45 MIN: CPT

## 2024-02-27 PROCEDURE — 80048 BASIC METABOLIC PNL TOTAL CA: CPT | Performed by: HOSPITALIST

## 2024-02-27 PROCEDURE — 72100 X-RAY EXAM L-S SPINE 2/3 VWS: CPT | Performed by: SURGERY

## 2024-02-27 PROCEDURE — 10004172 HB COUNTER-THERAPY VISIT OT

## 2024-02-27 PROCEDURE — 85025 COMPLETE CBC W/AUTO DIFF WBC: CPT | Performed by: PHYSICIAN ASSISTANT

## 2024-02-27 RX ORDER — POLYETHYLENE GLYCOL 3350 17 G/17G
17 POWDER, FOR SOLUTION ORAL DAILY
Status: DISCONTINUED | OUTPATIENT
Start: 2024-02-27 | End: 2024-02-29 | Stop reason: HOSPADM

## 2024-02-27 RX ORDER — DIAZEPAM 5 MG/1
5 TABLET ORAL EVERY 8 HOURS PRN
Status: DISCONTINUED | OUTPATIENT
Start: 2024-02-27 | End: 2024-02-29 | Stop reason: HOSPADM

## 2024-02-27 RX ORDER — OXYCODONE AND ACETAMINOPHEN 10; 325 MG/1; MG/1
1 TABLET ORAL EVERY 4 HOURS PRN
Status: DISCONTINUED | OUTPATIENT
Start: 2024-02-27 | End: 2024-02-29 | Stop reason: HOSPADM

## 2024-02-27 RX ORDER — OXYCODONE AND ACETAMINOPHEN 10; 325 MG/1; MG/1
2 TABLET ORAL EVERY 4 HOURS PRN
Status: DISCONTINUED | OUTPATIENT
Start: 2024-02-27 | End: 2024-02-29 | Stop reason: HOSPADM

## 2024-02-27 RX ADMIN — CEFAZOLIN SODIUM 2000 MG: 300 INJECTION, POWDER, LYOPHILIZED, FOR SOLUTION INTRAVENOUS at 14:40

## 2024-02-27 RX ADMIN — OXYCODONE HYDROCHLORIDE 10 MG: 5 TABLET ORAL at 06:10

## 2024-02-27 RX ADMIN — SODIUM CHLORIDE: 9 INJECTION, SOLUTION INTRAVENOUS at 08:13

## 2024-02-27 RX ADMIN — CEFAZOLIN SODIUM 2000 MG: 300 INJECTION, POWDER, LYOPHILIZED, FOR SOLUTION INTRAVENOUS at 06:10

## 2024-02-27 RX ADMIN — OXYCODONE AND ACETAMINOPHEN 2 TABLET: 10; 325 TABLET ORAL at 20:24

## 2024-02-27 RX ADMIN — OXYCODONE HYDROCHLORIDE 10 MG: 5 TABLET ORAL at 02:09

## 2024-02-27 RX ADMIN — Medication 1 PATCH: at 14:40

## 2024-02-27 RX ADMIN — Medication: at 08:11

## 2024-02-27 RX ADMIN — PREGABALIN 100 MG: 100 CAPSULE ORAL at 08:14

## 2024-02-27 RX ADMIN — OXYCODONE AND ACETAMINOPHEN 2 TABLET: 10; 325 TABLET ORAL at 16:15

## 2024-02-27 RX ADMIN — POLYETHYLENE GLYCOL (3350) 17 G: 17 POWDER, FOR SOLUTION ORAL at 08:15

## 2024-02-27 RX ADMIN — OXYCODONE AND ACETAMINOPHEN 2 TABLET: 10; 325 TABLET ORAL at 11:57

## 2024-02-27 RX ADMIN — CEFAZOLIN SODIUM 2000 MG: 300 INJECTION, POWDER, LYOPHILIZED, FOR SOLUTION INTRAVENOUS at 21:16

## 2024-02-27 RX ADMIN — TAMSULOSIN HYDROCHLORIDE 0.4 MG: 0.4 CAPSULE ORAL at 20:25

## 2024-02-27 RX ADMIN — DIAZEPAM 5 MG: 5 TABLET ORAL at 11:58

## 2024-02-27 RX ADMIN — LEVOTHYROXINE SODIUM 125 MCG: 0.05 TABLET ORAL at 06:10

## 2024-02-27 RX ADMIN — PREGABALIN 100 MG: 100 CAPSULE ORAL at 14:39

## 2024-02-27 RX ADMIN — ATORVASTATIN CALCIUM 20 MG: 20 TABLET, FILM COATED ORAL at 20:24

## 2024-02-27 RX ADMIN — PREGABALIN 100 MG: 100 CAPSULE ORAL at 20:25

## 2024-02-27 RX ADMIN — DIAZEPAM 5 MG: 5 TABLET ORAL at 21:15

## 2024-02-27 SDOH — HEALTH STABILITY: PHYSICAL HEALTH: DO YOU HAVE DIFFICULTY DRESSING OR BATHING?: NO

## 2024-02-27 SDOH — SOCIAL STABILITY: SOCIAL INSECURITY: HOW OFTEN DOES ANYONE, INCLUDING FAMILY AND FRIENDS, SCREAM OR CURSE AT YOU?: NEVER

## 2024-02-27 SDOH — SOCIAL STABILITY: SOCIAL INSECURITY: HOW OFTEN DOES ANYONE, INCLUDING FAMILY AND FRIENDS, INSULT OR TALK DOWN TO YOU?: NEVER

## 2024-02-27 SDOH — ECONOMIC STABILITY: INCOME INSECURITY: IN THE PAST 12 MONTHS, HAS THE ELECTRIC, GAS, OIL, OR WATER COMPANY THREATENED TO SHUT OFF SERVICE IN YOUR HOME?: NO

## 2024-02-27 SDOH — HEALTH STABILITY: PHYSICAL HEALTH: DO YOU HAVE SERIOUS DIFFICULTY WALKING OR CLIMBING STAIRS?: NO

## 2024-02-27 SDOH — SOCIAL STABILITY: SOCIAL INSECURITY: HOW OFTEN DOES ANYONE, INCLUDING FAMILY AND FRIENDS, THREATEN YOU WITH HARM?: NEVER

## 2024-02-27 SDOH — SOCIAL STABILITY: SOCIAL NETWORK: SUPPORT SYSTEMS: SPOUSE

## 2024-02-27 SDOH — ECONOMIC STABILITY: HOUSING INSECURITY: WHAT IS YOUR LIVING SITUATION TODAY?: SPOUSE

## 2024-02-27 SDOH — HEALTH STABILITY: GENERAL: BECAUSE OF A PHYSICAL, MENTAL, OR EMOTIONAL CONDITION, DO YOU HAVE DIFFICULTY DOING ERRANDS ALONE?: NO

## 2024-02-27 SDOH — ECONOMIC STABILITY: GENERAL

## 2024-02-27 SDOH — ECONOMIC STABILITY: HOUSING INSECURITY: DO YOU HAVE PROBLEMS WITH ANY OF THE FOLLOWING?: NONE OF THE ABOVE

## 2024-02-27 SDOH — ECONOMIC STABILITY: FOOD INSECURITY: WITHIN THE PAST 12 MONTHS, THE FOOD YOU BOUGHT JUST DIDN'T LAST AND YOU DIDN'T HAVE MONEY TO GET MORE.: NEVER TRUE

## 2024-02-27 SDOH — ECONOMIC STABILITY: HOUSING INSECURITY: WHAT IS YOUR LIVING SITUATION TODAY?: I HAVE A STEADY PLACE TO LIVE

## 2024-02-27 SDOH — HEALTH STABILITY: GENERAL
BECAUSE OF A PHYSICAL, MENTAL, OR EMOTIONAL CONDITION, DO YOU HAVE SERIOUS DIFFICULTY CONCENTRATING, REMEMBERING OR MAKING DECISIONS?: NO

## 2024-02-27 SDOH — SOCIAL STABILITY: SOCIAL NETWORK
HOW OFTEN DO YOU SEE OR TALK TO PEOPLE THAT YOU CARE ABOUT AND FEEL CLOSE TO? (FOR EXAMPLE: TALKING TO FRIENDS ON THE PHONE, VISITING FRIENDS OR FAMILY, GOING TO CHURCH OR CLUB MEETINGS): 5 OR MORE TIMES A WEEK

## 2024-02-27 SDOH — ECONOMIC STABILITY: TRANSPORTATION INSECURITY
IN THE PAST 12 MONTHS, HAS LACK OF RELIABLE TRANSPORTATION KEPT YOU FROM MEDICAL APPOINTMENTS, MEETINGS, WORK OR FROM GETTING THINGS NEEDED FOR DAILY LIVING?: NO

## 2024-02-27 SDOH — SOCIAL STABILITY: SOCIAL INSECURITY: HOW OFTEN DOES ANYONE, INCLUDING FAMILY AND FRIENDS, PHYSICALLY HURT YOU?: NEVER

## 2024-02-27 SDOH — ECONOMIC STABILITY: GENERAL: WOULD YOU LIKE HELP WITH ANY OF THE FOLLOWING NEEDS?: I DON'T WANT HELP WITH ANY OF THESE

## 2024-02-27 SDOH — ECONOMIC STABILITY: HOUSING INSECURITY: WHAT IS YOUR LIVING SITUATION TODAY?: HOUSE

## 2024-02-27 ASSESSMENT — PAIN SCALES - GENERAL
PAINLEVEL_OUTOF10: 8
PAINLEVEL_OUTOF10: 8
PAINLEVEL_OUTOF10: 7
PAINLEVEL_OUTOF10: 7
PAINLEVEL_OUTOF10: 6
PAINLEVEL_OUTOF10: 7
PAINLEVEL_OUTOF10: 7
PAINLEVEL_OUTOF10: 4
PAINLEVEL_OUTOF10: 7

## 2024-02-27 ASSESSMENT — COGNITIVE AND FUNCTIONAL STATUS - GENERAL
HELP NEEDED FOR TOILETING: A LITTLE
HELP NEEDED DRESSING REGULAR UPPER BODY CLOTHING: A LITTLE
DAILY_ACTIVITY_CONVERTED_SCORE: 42.03
BASIC_MOBILITY_RAW_SCORE: 24
DAILY_ACTIVITY_RAW_SCORE: 20
HELP NEEDED DRESSING REGULAR LOWER BODY CLOTHING: A LITTLE
BASIC_MOBILITY_CONVERTED_SCORE: 57.68
HELP NEEDED FOR BATHING: A LITTLE

## 2024-02-27 ASSESSMENT — PATIENT HEALTH QUESTIONNAIRE - PHQ9
SUM OF ALL RESPONSES TO PHQ9 QUESTIONS 1 AND 2: 0
2. FEELING DOWN, DEPRESSED OR HOPELESS: NOT AT ALL
SUM OF ALL RESPONSES TO PHQ9 QUESTIONS 1 AND 2: 0
IS PATIENT ABLE TO COMPLETE PHQ2 OR PHQ9: YES
CLINICAL INTERPRETATION OF PHQ2 SCORE: NO FURTHER SCREENING NEEDED
1. LITTLE INTEREST OR PLEASURE IN DOING THINGS: NOT AT ALL

## 2024-02-27 ASSESSMENT — ACTIVITIES OF DAILY LIVING (ADL)
HOME_MANAGEMENT_TIME_ENTRY: 25
RECENT_DECLINE_ADL: NO
ADL_SHORT_OF_BREATH: NO

## 2024-02-28 ENCOUNTER — APPOINTMENT (OUTPATIENT)
Dept: GENERAL RADIOLOGY | Age: 59
DRG: 453 | End: 2024-02-28
Attending: RADIOLOGY

## 2024-02-28 ENCOUNTER — APPOINTMENT (OUTPATIENT)
Dept: MRI IMAGING | Age: 59
DRG: 453 | End: 2024-02-28
Attending: PHYSICIAN ASSISTANT

## 2024-02-28 DIAGNOSIS — Z51.89 ENCOUNTER FOR THERAPEUTIC PROCEDURE: ICD-10-CM

## 2024-02-28 DIAGNOSIS — M54.16 LUMBAR RADICULOPATHY: Primary | ICD-10-CM

## 2024-02-28 LAB
BASOPHILS # BLD: 0 K/MCL (ref 0–0.3)
BASOPHILS NFR BLD: 0 %
DEPRECATED RDW RBC: 45.2 FL (ref 39–50)
EOSINOPHIL # BLD: 0.2 K/MCL (ref 0–0.5)
EOSINOPHIL NFR BLD: 2 %
ERYTHROCYTE [DISTWIDTH] IN BLOOD: 12.8 % (ref 11–15)
HCT VFR BLD CALC: 32.7 % (ref 39–51)
HGB BLD-MCNC: 10.9 G/DL (ref 13–17)
IMM GRANULOCYTES # BLD AUTO: 0 K/MCL (ref 0–0.2)
IMM GRANULOCYTES # BLD: 1 %
LYMPHOCYTES # BLD: 1.1 K/MCL (ref 1–4)
LYMPHOCYTES NFR BLD: 13 %
MCH RBC QN AUTO: 32.2 PG (ref 26–34)
MCHC RBC AUTO-ENTMCNC: 33.3 G/DL (ref 32–36.5)
MCV RBC AUTO: 96.7 FL (ref 78–100)
MONOCYTES # BLD: 1 K/MCL (ref 0.3–0.9)
MONOCYTES NFR BLD: 12 %
NEUTROPHILS # BLD: 6 K/MCL (ref 1.8–7.7)
NEUTROPHILS NFR BLD: 72 %
NRBC BLD MANUAL-RTO: 0 /100 WBC
PLATELET # BLD AUTO: 162 K/MCL (ref 140–450)
RBC # BLD: 3.38 MIL/MCL (ref 4.5–5.9)
WBC # BLD: 8.3 K/MCL (ref 4.2–11)

## 2024-02-28 PROCEDURE — 72157 MRI CHEST SPINE W/O & W/DYE: CPT

## 2024-02-28 PROCEDURE — 72070 X-RAY EXAM THORAC SPINE 2VWS: CPT

## 2024-02-28 PROCEDURE — 10002800 HB RX 250 W HCPCS: Performed by: PHYSICIAN ASSISTANT

## 2024-02-28 PROCEDURE — 72157 MRI CHEST SPINE W/O & W/DYE: CPT | Performed by: RADIOLOGY

## 2024-02-28 PROCEDURE — 10002803 HB RX 637: Performed by: PHYSICIAN ASSISTANT

## 2024-02-28 PROCEDURE — 72070 X-RAY EXAM THORAC SPINE 2VWS: CPT | Performed by: RADIOLOGY

## 2024-02-28 PROCEDURE — 85025 COMPLETE CBC W/AUTO DIFF WBC: CPT | Performed by: PHYSICIAN ASSISTANT

## 2024-02-28 PROCEDURE — 36415 COLL VENOUS BLD VENIPUNCTURE: CPT | Performed by: PHYSICIAN ASSISTANT

## 2024-02-28 PROCEDURE — 99232 SBSQ HOSP IP/OBS MODERATE 35: CPT | Performed by: NURSE PRACTITIONER

## 2024-02-28 PROCEDURE — 10002803 HB RX 637: Performed by: HOSPITALIST

## 2024-02-28 PROCEDURE — 10000002 HB ROOM CHARGE MED SURG

## 2024-02-28 RX ORDER — DIAZEPAM 5 MG/1
5 TABLET ORAL EVERY 8 HOURS PRN
Qty: 40 TABLET | Refills: 0 | Status: SHIPPED | OUTPATIENT
Start: 2024-02-28

## 2024-02-28 RX ORDER — OXYCODONE AND ACETAMINOPHEN 10; 325 MG/1; MG/1
1-2 TABLET ORAL EVERY 4 HOURS PRN
Qty: 80 TABLET | Refills: 0 | Status: SHIPPED | OUTPATIENT
Start: 2024-02-28

## 2024-02-28 RX ADMIN — POLYETHYLENE GLYCOL (3350) 17 G: 17 POWDER, FOR SOLUTION ORAL at 09:05

## 2024-02-28 RX ADMIN — OXYCODONE AND ACETAMINOPHEN 2 TABLET: 10; 325 TABLET ORAL at 21:35

## 2024-02-28 RX ADMIN — PREGABALIN 100 MG: 100 CAPSULE ORAL at 21:34

## 2024-02-28 RX ADMIN — DIAZEPAM 5 MG: 5 TABLET ORAL at 17:35

## 2024-02-28 RX ADMIN — DIAZEPAM 5 MG: 5 TABLET ORAL at 09:13

## 2024-02-28 RX ADMIN — CEFAZOLIN SODIUM 2000 MG: 300 INJECTION, POWDER, LYOPHILIZED, FOR SOLUTION INTRAVENOUS at 13:05

## 2024-02-28 RX ADMIN — OXYCODONE AND ACETAMINOPHEN 2 TABLET: 10; 325 TABLET ORAL at 00:37

## 2024-02-28 RX ADMIN — OXYCODONE AND ACETAMINOPHEN 2 TABLET: 10; 325 TABLET ORAL at 09:06

## 2024-02-28 RX ADMIN — OXYCODONE AND ACETAMINOPHEN 2 TABLET: 10; 325 TABLET ORAL at 04:39

## 2024-02-28 RX ADMIN — CEFAZOLIN SODIUM 2000 MG: 300 INJECTION, POWDER, LYOPHILIZED, FOR SOLUTION INTRAVENOUS at 06:10

## 2024-02-28 RX ADMIN — LEVOTHYROXINE SODIUM 125 MCG: 0.05 TABLET ORAL at 06:10

## 2024-02-28 RX ADMIN — OXYCODONE AND ACETAMINOPHEN 2 TABLET: 10; 325 TABLET ORAL at 13:06

## 2024-02-28 RX ADMIN — CEFAZOLIN SODIUM 2000 MG: 300 INJECTION, POWDER, LYOPHILIZED, FOR SOLUTION INTRAVENOUS at 21:35

## 2024-02-28 RX ADMIN — ATORVASTATIN CALCIUM 20 MG: 20 TABLET, FILM COATED ORAL at 21:34

## 2024-02-28 RX ADMIN — TAMSULOSIN HYDROCHLORIDE 0.4 MG: 0.4 CAPSULE ORAL at 21:35

## 2024-02-28 RX ADMIN — PREGABALIN 100 MG: 100 CAPSULE ORAL at 09:06

## 2024-02-28 RX ADMIN — PREGABALIN 100 MG: 100 CAPSULE ORAL at 13:05

## 2024-02-28 RX ADMIN — OXYCODONE AND ACETAMINOPHEN 2 TABLET: 10; 325 TABLET ORAL at 17:35

## 2024-02-28 RX ADMIN — Medication 1 PATCH: at 19:25

## 2024-02-28 ASSESSMENT — PAIN SCALES - GENERAL
PAINLEVEL_OUTOF10: 6
PAINLEVEL_OUTOF10: 8
PAINLEVEL_OUTOF10: 6
PAINLEVEL_OUTOF10: 4
PAINLEVEL_OUTOF10: 8
PAINLEVEL_OUTOF10: 8
PAINLEVEL_OUTOF10: 7
PAINLEVEL_OUTOF10: 10
PAINLEVEL_OUTOF10: 7
PAINLEVEL_OUTOF10: 6

## 2024-02-28 ASSESSMENT — PAIN DESCRIPTION - PAIN TYPE: TYPE: ACUTE PAIN

## 2024-02-29 VITALS
HEIGHT: 67 IN | WEIGHT: 178.57 LBS | RESPIRATION RATE: 16 BRPM | SYSTOLIC BLOOD PRESSURE: 127 MMHG | OXYGEN SATURATION: 98 % | TEMPERATURE: 98.2 F | HEART RATE: 64 BPM | DIASTOLIC BLOOD PRESSURE: 80 MMHG | BODY MASS INDEX: 28.03 KG/M2

## 2024-02-29 PROCEDURE — 10002800 HB RX 250 W HCPCS: Performed by: PHYSICIAN ASSISTANT

## 2024-02-29 PROCEDURE — 99232 SBSQ HOSP IP/OBS MODERATE 35: CPT | Performed by: NURSE PRACTITIONER

## 2024-02-29 PROCEDURE — 10002803 HB RX 637: Performed by: PHYSICIAN ASSISTANT

## 2024-02-29 PROCEDURE — A9585 GADOBUTROL INJECTION: HCPCS | Performed by: PHYSICIAN ASSISTANT

## 2024-02-29 PROCEDURE — 10002805 HB CONTRAST AGENT: Performed by: PHYSICIAN ASSISTANT

## 2024-02-29 PROCEDURE — 10002803 HB RX 637: Performed by: HOSPITALIST

## 2024-02-29 RX ORDER — GADOBUTROL 604.72 MG/ML
8 INJECTION INTRAVENOUS ONCE
Status: COMPLETED | OUTPATIENT
Start: 2024-02-29 | End: 2024-02-29

## 2024-02-29 RX ADMIN — OXYCODONE AND ACETAMINOPHEN 2 TABLET: 10; 325 TABLET ORAL at 05:56

## 2024-02-29 RX ADMIN — CEFAZOLIN SODIUM 2000 MG: 300 INJECTION, POWDER, LYOPHILIZED, FOR SOLUTION INTRAVENOUS at 05:58

## 2024-02-29 RX ADMIN — GADOBUTROL 8 ML: 604.72 INJECTION INTRAVENOUS at 02:25

## 2024-02-29 RX ADMIN — POLYETHYLENE GLYCOL (3350) 17 G: 17 POWDER, FOR SOLUTION ORAL at 09:05

## 2024-02-29 RX ADMIN — OXYCODONE AND ACETAMINOPHEN 2 TABLET: 10; 325 TABLET ORAL at 10:01

## 2024-02-29 RX ADMIN — PREGABALIN 100 MG: 100 CAPSULE ORAL at 09:05

## 2024-02-29 RX ADMIN — OXYCODONE AND ACETAMINOPHEN 2 TABLET: 10; 325 TABLET ORAL at 01:37

## 2024-02-29 RX ADMIN — DIAZEPAM 5 MG: 5 TABLET ORAL at 10:01

## 2024-02-29 RX ADMIN — LEVOTHYROXINE SODIUM 125 MCG: 0.05 TABLET ORAL at 05:55

## 2024-02-29 RX ADMIN — DIAZEPAM 5 MG: 5 TABLET ORAL at 01:36

## 2024-02-29 ASSESSMENT — PAIN SCALES - GENERAL
PAINLEVEL_OUTOF10: 7
PAINLEVEL_OUTOF10: 7
PAINLEVEL_OUTOF10: 4
PAINLEVEL_OUTOF10: 7
PAINLEVEL_OUTOF10: 4

## 2024-02-29 ASSESSMENT — PAIN DESCRIPTION - PAIN TYPE
TYPE: ACUTE PAIN

## 2024-03-02 ENCOUNTER — CASE MANAGEMENT (OUTPATIENT)
Dept: CARE COORDINATION | Age: 59
End: 2024-03-02

## 2024-03-02 ENCOUNTER — TELEPHONE (OUTPATIENT)
Dept: CARE COORDINATION | Age: 59
End: 2024-03-02

## 2024-03-09 ENCOUNTER — TELEPHONE (OUTPATIENT)
Dept: CARE COORDINATION | Age: 59
End: 2024-03-09

## 2024-03-10 ENCOUNTER — TELEPHONE (OUTPATIENT)
Dept: CARE COORDINATION | Age: 59
End: 2024-03-10

## 2024-03-16 ENCOUNTER — TELEPHONE (OUTPATIENT)
Dept: CARE COORDINATION | Age: 59
End: 2024-03-16

## 2024-03-16 ENCOUNTER — CASE MANAGEMENT (OUTPATIENT)
Dept: CARE COORDINATION | Age: 59
End: 2024-03-16

## 2024-03-23 ENCOUNTER — CASE MANAGEMENT (OUTPATIENT)
Dept: CARE COORDINATION | Age: 59
End: 2024-03-23

## 2024-03-23 ENCOUNTER — TELEPHONE (OUTPATIENT)
Dept: CARE COORDINATION | Age: 59
End: 2024-03-23

## 2024-03-30 ENCOUNTER — TELEPHONE (OUTPATIENT)
Dept: CARE COORDINATION | Age: 59
End: 2024-03-30

## 2024-03-31 ENCOUNTER — TELEPHONE (OUTPATIENT)
Dept: CARE COORDINATION | Age: 59
End: 2024-03-31

## 2024-04-18 DIAGNOSIS — M54.16 LUMBAR RADICULOPATHY: ICD-10-CM

## 2024-04-18 DIAGNOSIS — M51.36 DDD (DEGENERATIVE DISC DISEASE), LUMBAR: Primary | ICD-10-CM

## 2024-04-23 ENCOUNTER — APPOINTMENT (OUTPATIENT)
Dept: REHABILITATION | Age: 59
End: 2024-04-23

## 2024-04-26 ENCOUNTER — HOSPITAL ENCOUNTER (OUTPATIENT)
Dept: REHABILITATION | Age: 59
Discharge: STILL ADMITTED OR ADMINISTRATIVE | End: 2024-04-26

## 2024-04-26 PROCEDURE — 97110 THERAPEUTIC EXERCISES: CPT | Performed by: PHYSICAL THERAPIST

## 2024-04-26 PROCEDURE — 97162 PT EVAL MOD COMPLEX 30 MIN: CPT | Performed by: PHYSICAL THERAPIST

## 2024-04-26 PROCEDURE — 10004173 HB COUNTER-THERAPY VISIT PT: Performed by: PHYSICAL THERAPIST

## 2024-04-26 ASSESSMENT — ENCOUNTER SYMPTOMS
PAIN SCALE AT HIGHEST: 9
QUALITY: BURNING
SUBJECTIVE PAIN PROGRESSION: IMPROVED
QUALITY: SHARP
ALLEVIATING FACTORS: PRESCRIPTION MEDICATIONS
ALLEVIATING FACTORS: ICE
PAIN LOCATION: LUMBAR SPINE
ALLEVIATING FACTORS: OVER-THE-COUNTER MEDICATION
ALLEVIATING FACTORS: REST
PAIN SCALE AT LOWEST: 1
PAIN SEVERITY NOW: 6
QUALITY: ACHE
ALLEVIATING FACTORS: AVOIDING MOVEMENT IN INVOLVED AREA

## 2024-05-07 ENCOUNTER — HOSPITAL ENCOUNTER (OUTPATIENT)
Dept: REHABILITATION | Age: 59
Discharge: STILL ADMITTED OR ADMINISTRATIVE | End: 2024-05-07

## 2024-05-07 PROCEDURE — 97110 THERAPEUTIC EXERCISES: CPT | Performed by: PHYSICAL THERAPIST

## 2024-05-07 PROCEDURE — 10004173 HB COUNTER-THERAPY VISIT PT: Performed by: PHYSICAL THERAPIST

## 2024-05-07 ASSESSMENT — ENCOUNTER SYMPTOMS: PAIN SEVERITY NOW: 7

## 2024-05-10 ENCOUNTER — HOSPITAL ENCOUNTER (OUTPATIENT)
Dept: REHABILITATION | Age: 59
Discharge: STILL ADMITTED OR ADMINISTRATIVE | End: 2024-05-10

## 2024-05-10 PROCEDURE — 97140 MANUAL THERAPY 1/> REGIONS: CPT | Performed by: PHYSICAL THERAPIST

## 2024-05-10 PROCEDURE — 10004173 HB COUNTER-THERAPY VISIT PT: Performed by: PHYSICAL THERAPIST

## 2024-05-10 PROCEDURE — 97110 THERAPEUTIC EXERCISES: CPT | Performed by: PHYSICAL THERAPIST

## 2024-05-10 ASSESSMENT — ENCOUNTER SYMPTOMS: PAIN SEVERITY NOW: 6

## 2024-05-13 ENCOUNTER — HOSPITAL ENCOUNTER (OUTPATIENT)
Dept: REHABILITATION | Age: 59
Discharge: STILL ADMITTED OR ADMINISTRATIVE | End: 2024-05-13

## 2024-05-13 PROCEDURE — 10004173 HB COUNTER-THERAPY VISIT PT: Performed by: PHYSICAL THERAPIST

## 2024-05-13 PROCEDURE — 97110 THERAPEUTIC EXERCISES: CPT | Performed by: PHYSICAL THERAPIST

## 2024-05-13 ASSESSMENT — ENCOUNTER SYMPTOMS
PAIN SCALE AT LOWEST: 2
PAIN SEVERITY NOW: 4
PAIN SCALE AT HIGHEST: 7

## 2024-05-17 ENCOUNTER — HOSPITAL ENCOUNTER (OUTPATIENT)
Dept: REHABILITATION | Age: 59
Discharge: STILL ADMITTED OR ADMINISTRATIVE | End: 2024-05-17

## 2024-05-17 PROCEDURE — 97110 THERAPEUTIC EXERCISES: CPT | Performed by: PHYSICAL THERAPIST

## 2024-05-17 PROCEDURE — 10004173 HB COUNTER-THERAPY VISIT PT: Performed by: PHYSICAL THERAPIST

## 2024-05-17 ASSESSMENT — ENCOUNTER SYMPTOMS
PAIN SCALE AT HIGHEST: 6
PAIN SEVERITY NOW: 4
PAIN SCALE AT LOWEST: 2

## 2024-05-20 ENCOUNTER — HOSPITAL ENCOUNTER (OUTPATIENT)
Dept: REHABILITATION | Age: 59
Discharge: STILL ADMITTED OR ADMINISTRATIVE | End: 2024-05-20

## 2024-05-20 PROCEDURE — 10004173 HB COUNTER-THERAPY VISIT PT: Performed by: PHYSICAL THERAPIST

## 2024-05-20 PROCEDURE — 97110 THERAPEUTIC EXERCISES: CPT | Performed by: PHYSICAL THERAPIST

## 2024-05-20 ASSESSMENT — ENCOUNTER SYMPTOMS
PAIN SCALE AT LOWEST: 3
PAIN SCALE AT HIGHEST: 8
PAIN SEVERITY NOW: 6

## 2024-05-24 ENCOUNTER — HOSPITAL ENCOUNTER (OUTPATIENT)
Dept: REHABILITATION | Age: 59
Discharge: STILL ADMITTED OR ADMINISTRATIVE | End: 2024-05-24

## 2024-05-24 PROCEDURE — 10004173 HB COUNTER-THERAPY VISIT PT: Performed by: PHYSICAL THERAPIST

## 2024-05-24 PROCEDURE — 97110 THERAPEUTIC EXERCISES: CPT | Performed by: PHYSICAL THERAPIST

## 2024-05-24 ASSESSMENT — ENCOUNTER SYMPTOMS: PAIN SEVERITY NOW: 4

## 2024-05-31 ENCOUNTER — APPOINTMENT (OUTPATIENT)
Dept: REHABILITATION | Age: 59
End: 2024-05-31

## 2024-06-03 ENCOUNTER — HOSPITAL ENCOUNTER (OUTPATIENT)
Dept: REHABILITATION | Age: 59
Discharge: STILL ADMITTED OR ADMINISTRATIVE | End: 2024-06-03

## 2024-06-03 PROCEDURE — 97110 THERAPEUTIC EXERCISES: CPT | Performed by: PHYSICAL THERAPY ASSISTANT

## 2024-06-03 PROCEDURE — 10004173 HB COUNTER-THERAPY VISIT PT: Performed by: PHYSICAL THERAPY ASSISTANT

## 2024-06-03 PROCEDURE — 97112 NEUROMUSCULAR REEDUCATION: CPT | Performed by: PHYSICAL THERAPY ASSISTANT

## 2024-06-03 ASSESSMENT — ENCOUNTER SYMPTOMS: PAIN SEVERITY NOW: 6

## 2024-06-07 ENCOUNTER — APPOINTMENT (OUTPATIENT)
Dept: REHABILITATION | Age: 59
End: 2024-06-07

## 2024-06-11 ENCOUNTER — HOSPITAL ENCOUNTER (OUTPATIENT)
Dept: REHABILITATION | Age: 59
Discharge: STILL ADMITTED OR ADMINISTRATIVE | End: 2024-06-11

## 2024-06-11 DIAGNOSIS — M51.36 DDD (DEGENERATIVE DISC DISEASE), LUMBAR: Primary | ICD-10-CM

## 2024-06-11 DIAGNOSIS — M54.16 RADICULOPATHY OF LUMBAR REGION: ICD-10-CM

## 2024-06-11 PROCEDURE — 97110 THERAPEUTIC EXERCISES: CPT | Performed by: PHYSICAL THERAPIST

## 2024-06-11 PROCEDURE — 10004173 HB COUNTER-THERAPY VISIT PT: Performed by: PHYSICAL THERAPIST

## 2024-06-11 ASSESSMENT — ENCOUNTER SYMPTOMS: PAIN SEVERITY NOW: 6

## 2024-06-14 ENCOUNTER — HOSPITAL ENCOUNTER (OUTPATIENT)
Dept: REHABILITATION | Age: 59
Discharge: STILL ADMITTED OR ADMINISTRATIVE | End: 2024-06-14

## 2024-06-14 PROCEDURE — 10004173 HB COUNTER-THERAPY VISIT PT: Performed by: PHYSICAL THERAPIST

## 2024-06-14 PROCEDURE — 97110 THERAPEUTIC EXERCISES: CPT | Performed by: PHYSICAL THERAPIST

## 2024-06-14 ASSESSMENT — 6 MINUTE WALK TEST (6MWT)
TOTAL DISTANCE WALKED (FT): 1124
BORG FATIGUE SCALE SCORE: 7

## 2024-06-14 ASSESSMENT — GAIT ASSESSMENTS
TUG TURNS: STABLE ON TURNS
TUG TIME: NO ASSISTIVE DEVICE
TUG TIME: 13

## 2024-06-14 ASSESSMENT — ENCOUNTER SYMPTOMS
PAIN SCALE AT LOWEST: 3
PAIN SCALE AT HIGHEST: 7
PAIN SEVERITY NOW: 5

## 2024-06-19 ENCOUNTER — HOSPITAL ENCOUNTER (OUTPATIENT)
Dept: REHABILITATION | Age: 59
Discharge: STILL ADMITTED OR ADMINISTRATIVE | End: 2024-06-19

## 2024-06-19 PROCEDURE — 10004173 HB COUNTER-THERAPY VISIT PT: Performed by: PHYSICAL THERAPIST

## 2024-06-19 PROCEDURE — 97110 THERAPEUTIC EXERCISES: CPT | Performed by: PHYSICAL THERAPIST

## 2024-06-19 ASSESSMENT — ENCOUNTER SYMPTOMS: PAIN SEVERITY NOW: 5

## 2024-06-24 ENCOUNTER — HOSPITAL ENCOUNTER (OUTPATIENT)
Dept: REHABILITATION | Age: 59
Discharge: STILL ADMITTED OR ADMINISTRATIVE | End: 2024-06-24

## 2024-06-24 PROCEDURE — 97110 THERAPEUTIC EXERCISES: CPT | Performed by: PHYSICAL THERAPIST

## 2024-06-24 PROCEDURE — 10004173 HB COUNTER-THERAPY VISIT PT: Performed by: PHYSICAL THERAPIST

## 2024-06-24 ASSESSMENT — ENCOUNTER SYMPTOMS
PAIN SCALE AT LOWEST: 2
PAIN SCALE AT HIGHEST: 8
PAIN SEVERITY NOW: 6

## 2024-06-26 ENCOUNTER — APPOINTMENT (OUTPATIENT)
Dept: REHABILITATION | Age: 59
End: 2024-06-26

## 2024-07-01 ENCOUNTER — APPOINTMENT (OUTPATIENT)
Dept: REHABILITATION | Age: 59
End: 2024-07-01

## 2024-07-03 ENCOUNTER — APPOINTMENT (OUTPATIENT)
Dept: REHABILITATION | Age: 59
End: 2024-07-03

## 2024-07-09 ENCOUNTER — HOSPITAL ENCOUNTER (OUTPATIENT)
Dept: REHABILITATION | Age: 59
Discharge: STILL ADMITTED OR ADMINISTRATIVE | End: 2024-07-09

## 2024-07-09 PROCEDURE — 97110 THERAPEUTIC EXERCISES: CPT | Performed by: PHYSICAL THERAPIST

## 2024-07-09 PROCEDURE — 10004173 HB COUNTER-THERAPY VISIT PT: Performed by: PHYSICAL THERAPIST

## 2024-07-09 ASSESSMENT — ENCOUNTER SYMPTOMS
PAIN SEVERITY NOW: 7
PAIN SCALE AT HIGHEST: 9
PAIN SCALE AT LOWEST: 3

## 2024-07-12 ENCOUNTER — HOSPITAL ENCOUNTER (OUTPATIENT)
Dept: REHABILITATION | Age: 59
Discharge: STILL ADMITTED OR ADMINISTRATIVE | End: 2024-07-12

## 2024-07-12 PROCEDURE — 10004173 HB COUNTER-THERAPY VISIT PT: Performed by: PHYSICAL THERAPIST

## 2024-07-12 PROCEDURE — 97110 THERAPEUTIC EXERCISES: CPT | Performed by: PHYSICAL THERAPIST

## 2024-07-12 ASSESSMENT — GAIT ASSESSMENTS
TUG TIME: NO ASSISTIVE DEVICE
TUG TURNS: STABLE ON TURNS
TUG TIME: 15

## 2024-07-12 ASSESSMENT — 6 MINUTE WALK TEST (6MWT)
BORG FATIGUE SCALE SCORE: 3
TOTAL DISTANCE WALKED (FT): 955

## 2024-07-12 ASSESSMENT — ENCOUNTER SYMPTOMS
PAIN SCALE AT HIGHEST: 8
PAIN SEVERITY NOW: 4
PAIN SCALE AT LOWEST: 1

## 2024-07-16 ENCOUNTER — HOSPITAL ENCOUNTER (OUTPATIENT)
Dept: REHABILITATION | Age: 59
Discharge: STILL ADMITTED OR ADMINISTRATIVE | End: 2024-07-16

## 2024-07-16 DIAGNOSIS — M51.36 DDD (DEGENERATIVE DISC DISEASE), LUMBAR: Primary | ICD-10-CM

## 2024-07-16 DIAGNOSIS — M54.16 RADICULOPATHY OF LUMBAR REGION: ICD-10-CM

## 2024-07-16 PROCEDURE — 97535 SELF CARE MNGMENT TRAINING: CPT | Performed by: PHYSICAL THERAPIST

## 2024-07-16 PROCEDURE — 10004173 HB COUNTER-THERAPY VISIT PT: Performed by: PHYSICAL THERAPIST

## 2024-07-16 ASSESSMENT — ENCOUNTER SYMPTOMS: PAIN SEVERITY NOW: 4

## 2024-07-19 ENCOUNTER — HOSPITAL ENCOUNTER (OUTPATIENT)
Dept: REHABILITATION | Age: 59
Discharge: STILL ADMITTED OR ADMINISTRATIVE | End: 2024-07-19

## 2024-07-19 PROCEDURE — 10004173 HB COUNTER-THERAPY VISIT PT: Performed by: PHYSICAL THERAPIST

## 2024-07-19 PROCEDURE — 97162 PT EVAL MOD COMPLEX 30 MIN: CPT | Performed by: PHYSICAL THERAPIST

## 2024-07-19 PROCEDURE — 97535 SELF CARE MNGMENT TRAINING: CPT | Performed by: PHYSICAL THERAPIST

## 2024-07-19 PROCEDURE — 97110 THERAPEUTIC EXERCISES: CPT | Performed by: PHYSICAL THERAPIST

## 2024-07-19 ASSESSMENT — ENCOUNTER SYMPTOMS
PAIN FREQUENCY: CONSTANT
ALLEVIATING FACTORS: PRESCRIPTION MEDICATIONS
PAIN SCALE AT HIGHEST: 8
PAIN LOCATION: LUMBAR
ALLEVIATING FACTORS: REST
PAIN SEVERITY NOW: 3
SUBJECTIVE PAIN PROGRESSION: WORSENING
ALLEVIATING FACTOR: SITTING IN RECLINER
PAIN SCALE AT LOWEST: 2

## 2024-07-19 ASSESSMENT — GAIT ASSESSMENTS
TUG TIME: 15
TUG TURNS: STABLE ON TURNS
TUG TIME: NO ASSISTIVE DEVICE

## 2024-07-19 ASSESSMENT — 6 MINUTE WALK TEST (6MWT)
BORG FATIGUE SCALE SCORE: 3
TOTAL DISTANCE WALKED (FT): 955

## 2024-07-23 ENCOUNTER — HOSPITAL ENCOUNTER (OUTPATIENT)
Dept: REHABILITATION | Age: 59
Discharge: STILL ADMITTED OR ADMINISTRATIVE | End: 2024-07-23

## 2024-07-23 PROCEDURE — 97530 THERAPEUTIC ACTIVITIES: CPT | Performed by: PHYSICAL THERAPIST

## 2024-07-23 PROCEDURE — 10004173 HB COUNTER-THERAPY VISIT PT: Performed by: PHYSICAL THERAPIST

## 2024-07-23 PROCEDURE — 97110 THERAPEUTIC EXERCISES: CPT | Performed by: PHYSICAL THERAPIST

## 2024-07-23 PROCEDURE — 97112 NEUROMUSCULAR REEDUCATION: CPT | Performed by: PHYSICAL THERAPIST

## 2024-07-23 ASSESSMENT — ENCOUNTER SYMPTOMS: PAIN SEVERITY NOW: 3

## 2024-07-26 ENCOUNTER — HOSPITAL ENCOUNTER (OUTPATIENT)
Dept: REHABILITATION | Age: 59
Discharge: HOME | End: 2024-07-26

## 2024-07-26 PROCEDURE — 97110 THERAPEUTIC EXERCISES: CPT | Performed by: PHYSICAL THERAPIST

## 2024-07-26 PROCEDURE — 10004173 HB COUNTER-THERAPY VISIT PT: Performed by: PHYSICAL THERAPIST

## 2024-07-26 PROCEDURE — 97530 THERAPEUTIC ACTIVITIES: CPT | Performed by: PHYSICAL THERAPIST

## 2024-07-26 ASSESSMENT — ENCOUNTER SYMPTOMS: PAIN SEVERITY NOW: 4

## 2024-07-30 ENCOUNTER — APPOINTMENT (OUTPATIENT)
Dept: REHABILITATION | Age: 59
End: 2024-07-30

## 2024-07-30 PROCEDURE — 97110 THERAPEUTIC EXERCISES: CPT | Performed by: PHYSICAL THERAPIST

## 2024-07-30 PROCEDURE — 10004173 HB COUNTER-THERAPY VISIT PT: Performed by: PHYSICAL THERAPIST

## 2024-07-30 PROCEDURE — 97530 THERAPEUTIC ACTIVITIES: CPT | Performed by: PHYSICAL THERAPIST

## 2024-07-30 ASSESSMENT — ENCOUNTER SYMPTOMS: PAIN SEVERITY NOW: 6

## 2024-08-02 ENCOUNTER — APPOINTMENT (OUTPATIENT)
Dept: REHABILITATION | Age: 59
End: 2024-08-02

## 2024-08-05 ENCOUNTER — APPOINTMENT (OUTPATIENT)
Dept: REHABILITATION | Age: 59
End: 2024-08-05

## 2024-08-07 ENCOUNTER — APPOINTMENT (OUTPATIENT)
Dept: REHABILITATION | Age: 59
End: 2024-08-07

## 2024-08-12 ENCOUNTER — HOSPITAL ENCOUNTER (OUTPATIENT)
Dept: REHABILITATION | Age: 59
Discharge: STILL ADMITTED OR ADMINISTRATIVE | End: 2024-08-12

## 2024-08-12 PROCEDURE — 10004173 HB COUNTER-THERAPY VISIT PT: Performed by: PHYSICAL THERAPIST

## 2024-08-12 PROCEDURE — 97530 THERAPEUTIC ACTIVITIES: CPT | Performed by: PHYSICAL THERAPIST

## 2024-08-12 PROCEDURE — 97110 THERAPEUTIC EXERCISES: CPT | Performed by: PHYSICAL THERAPIST

## 2024-08-12 ASSESSMENT — 6 MINUTE WALK TEST (6MWT): TOTAL DISTANCE WALKED (FT): 1358

## 2024-08-12 ASSESSMENT — GAIT ASSESSMENTS
TUG TURNS: STABLE ON TURNS
TUG TIME: 10
TUG TIME: NO ASSISTIVE DEVICE

## 2024-08-12 ASSESSMENT — ENCOUNTER SYMPTOMS: PAIN SEVERITY NOW: 6

## 2024-08-14 ENCOUNTER — HOSPITAL ENCOUNTER (OUTPATIENT)
Dept: REHABILITATION | Age: 59
Discharge: STILL ADMITTED OR ADMINISTRATIVE | End: 2024-08-14

## 2024-08-14 PROCEDURE — 97110 THERAPEUTIC EXERCISES: CPT | Performed by: PHYSICAL THERAPIST

## 2024-08-14 PROCEDURE — 97530 THERAPEUTIC ACTIVITIES: CPT | Performed by: PHYSICAL THERAPIST

## 2024-08-14 PROCEDURE — 10004173 HB COUNTER-THERAPY VISIT PT: Performed by: PHYSICAL THERAPIST

## 2024-08-14 ASSESSMENT — ENCOUNTER SYMPTOMS: PAIN SEVERITY NOW: 6

## 2024-08-19 ENCOUNTER — HOSPITAL ENCOUNTER (OUTPATIENT)
Dept: REHABILITATION | Age: 59
Discharge: STILL ADMITTED OR ADMINISTRATIVE | End: 2024-08-19

## 2024-08-19 PROCEDURE — 97110 THERAPEUTIC EXERCISES: CPT | Performed by: PHYSICAL THERAPIST

## 2024-08-19 PROCEDURE — 97112 NEUROMUSCULAR REEDUCATION: CPT | Performed by: PHYSICAL THERAPIST

## 2024-08-19 PROCEDURE — 97530 THERAPEUTIC ACTIVITIES: CPT | Performed by: PHYSICAL THERAPIST

## 2024-08-19 PROCEDURE — 10004173 HB COUNTER-THERAPY VISIT PT: Performed by: PHYSICAL THERAPIST

## 2024-08-19 ASSESSMENT — ENCOUNTER SYMPTOMS: PAIN SEVERITY NOW: 5

## 2024-08-21 ENCOUNTER — HOSPITAL ENCOUNTER (OUTPATIENT)
Dept: REHABILITATION | Age: 59
Discharge: STILL ADMITTED OR ADMINISTRATIVE | End: 2024-08-21

## 2024-08-21 PROCEDURE — 97530 THERAPEUTIC ACTIVITIES: CPT | Performed by: PHYSICAL THERAPIST

## 2024-08-21 PROCEDURE — 97112 NEUROMUSCULAR REEDUCATION: CPT | Performed by: PHYSICAL THERAPIST

## 2024-08-21 PROCEDURE — 10004173 HB COUNTER-THERAPY VISIT PT: Performed by: PHYSICAL THERAPIST

## 2024-08-21 PROCEDURE — 97110 THERAPEUTIC EXERCISES: CPT | Performed by: PHYSICAL THERAPIST

## 2024-08-21 ASSESSMENT — ENCOUNTER SYMPTOMS: PAIN SEVERITY NOW: 2

## 2024-08-28 ENCOUNTER — HOSPITAL ENCOUNTER (OUTPATIENT)
Dept: REHABILITATION | Age: 59
Discharge: STILL ADMITTED OR ADMINISTRATIVE | End: 2024-08-28

## 2024-08-28 PROCEDURE — 97530 THERAPEUTIC ACTIVITIES: CPT | Performed by: PHYSICAL THERAPIST

## 2024-08-28 PROCEDURE — 10004173 HB COUNTER-THERAPY VISIT PT: Performed by: PHYSICAL THERAPIST

## 2024-08-28 PROCEDURE — 97110 THERAPEUTIC EXERCISES: CPT | Performed by: PHYSICAL THERAPIST

## 2024-08-28 ASSESSMENT — ENCOUNTER SYMPTOMS
PAIN SEVERITY NOW: 4
PAIN SCALE AT LOWEST: 3
PAIN SCALE AT HIGHEST: 8

## 2024-08-28 ASSESSMENT — GAIT ASSESSMENTS
TUG TURNS: STABLE ON TURNS
TUG TIME: NO ASSISTIVE DEVICE
TUG TIME: 9

## 2024-08-28 ASSESSMENT — 6 MINUTE WALK TEST (6MWT)
BORG FATIGUE SCALE SCORE: 4
TOTAL DISTANCE WALKED (FT): 1441

## 2024-08-30 ENCOUNTER — APPOINTMENT (OUTPATIENT)
Dept: REHABILITATION | Age: 59
End: 2024-08-30

## 2024-09-03 ENCOUNTER — HOSPITAL ENCOUNTER (OUTPATIENT)
Dept: REHABILITATION | Age: 59
Discharge: STILL ADMITTED OR ADMINISTRATIVE | End: 2024-09-03

## 2024-09-03 PROCEDURE — 97110 THERAPEUTIC EXERCISES: CPT | Performed by: PHYSICAL THERAPIST

## 2024-09-03 PROCEDURE — 97112 NEUROMUSCULAR REEDUCATION: CPT | Performed by: PHYSICAL THERAPIST

## 2024-09-03 PROCEDURE — 10004173 HB COUNTER-THERAPY VISIT PT: Performed by: PHYSICAL THERAPIST

## 2024-09-03 PROCEDURE — 97530 THERAPEUTIC ACTIVITIES: CPT | Performed by: PHYSICAL THERAPIST

## 2024-09-03 ASSESSMENT — ENCOUNTER SYMPTOMS
PAIN SCALE AT LOWEST: 3
PAIN SEVERITY NOW: 6
PAIN SCALE AT HIGHEST: 7

## 2024-09-05 ENCOUNTER — APPOINTMENT (OUTPATIENT)
Dept: REHABILITATION | Age: 59
End: 2024-09-05

## 2024-09-09 ENCOUNTER — HOSPITAL ENCOUNTER (OUTPATIENT)
Dept: REHABILITATION | Age: 59
Discharge: STILL ADMITTED OR ADMINISTRATIVE | End: 2024-09-09

## 2024-09-09 PROCEDURE — 97530 THERAPEUTIC ACTIVITIES: CPT | Performed by: PHYSICAL THERAPIST

## 2024-09-09 PROCEDURE — 10004173 HB COUNTER-THERAPY VISIT PT: Performed by: PHYSICAL THERAPIST

## 2024-09-09 PROCEDURE — 97110 THERAPEUTIC EXERCISES: CPT | Performed by: PHYSICAL THERAPIST

## 2024-09-09 ASSESSMENT — ENCOUNTER SYMPTOMS: PAIN SEVERITY NOW: 7

## 2024-09-11 ENCOUNTER — APPOINTMENT (OUTPATIENT)
Dept: REHABILITATION | Age: 59
End: 2024-09-11

## 2024-09-12 ENCOUNTER — APPOINTMENT (OUTPATIENT)
Dept: REHABILITATION | Age: 59
End: 2024-09-12

## 2024-09-16 ENCOUNTER — APPOINTMENT (OUTPATIENT)
Dept: REHABILITATION | Age: 59
End: 2024-09-16

## 2024-09-17 ENCOUNTER — APPOINTMENT (OUTPATIENT)
Dept: REHABILITATION | Age: 59
End: 2024-09-17

## 2024-09-19 ENCOUNTER — HOSPITAL ENCOUNTER (OUTPATIENT)
Dept: REHABILITATION | Age: 59
Discharge: STILL ADMITTED OR ADMINISTRATIVE | End: 2024-09-19

## 2024-09-19 PROCEDURE — 10004173 HB COUNTER-THERAPY VISIT PT: Performed by: PHYSICAL THERAPIST

## 2024-09-19 PROCEDURE — 97110 THERAPEUTIC EXERCISES: CPT | Performed by: PHYSICAL THERAPIST

## 2024-09-19 PROCEDURE — 97530 THERAPEUTIC ACTIVITIES: CPT | Performed by: PHYSICAL THERAPIST

## 2024-09-19 ASSESSMENT — ENCOUNTER SYMPTOMS: PAIN SEVERITY NOW: 5

## 2024-09-23 ENCOUNTER — APPOINTMENT (OUTPATIENT)
Dept: REHABILITATION | Age: 59
End: 2024-09-23

## 2024-09-24 ENCOUNTER — APPOINTMENT (OUTPATIENT)
Dept: REHABILITATION | Age: 59
End: 2024-09-24

## 2024-09-25 ENCOUNTER — APPOINTMENT (OUTPATIENT)
Dept: REHABILITATION | Age: 59
End: 2024-09-25

## 2024-09-26 ENCOUNTER — APPOINTMENT (OUTPATIENT)
Dept: REHABILITATION | Age: 59
End: 2024-09-26

## 2024-09-30 ENCOUNTER — APPOINTMENT (OUTPATIENT)
Dept: REHABILITATION | Age: 59
End: 2024-09-30

## 2024-10-02 ENCOUNTER — APPOINTMENT (OUTPATIENT)
Dept: REHABILITATION | Age: 59
End: 2024-10-02

## 2024-10-04 ENCOUNTER — APPOINTMENT (OUTPATIENT)
Dept: REHABILITATION | Age: 59
End: 2024-10-04

## 2025-06-10 ENCOUNTER — APPOINTMENT (OUTPATIENT)
Dept: FAMILY MEDICINE | Age: 60
End: 2025-06-10

## (undated) DEVICE — SUTURE VCL+ 0 CT1 18IN CNTRL RELS BRAID 8 STRN

## (undated) DEVICE — GLOVE SURG 9 PROTEXIS LF BLUE PF SMTH BEAD CUFF INTLK STRL

## (undated) DEVICE — PROBE PEDCL SCREW DISP 2.3MM

## (undated) DEVICE — SUTURE VCL+ 1 CTX 18IN CNTRL RELS BRAID 8 STRN

## (undated) DEVICE — SUTURE ETHILON 3-0 FSL 30IN MONO NABSB BLK 1671H

## (undated) DEVICE — PRONE POSITIONING KITS

## (undated) DEVICE — GLOVE SURG 7 PROTEXIS PI LF CRM PF BEAD CUFF STRL PLISPRN

## (undated) DEVICE — MILL BN BIOPSY MED CRS BLADE 5MM LF DISP

## (undated) DEVICE — BULB 100CC SIL DRN LTWT LOW LVL SCT WND DRN STRL LF DISP

## (undated) DEVICE — BLANKET WRM UPR BODY ADULT 78X24IN BR HGR PLMR 3.7OZ

## (undated) DEVICE — SUTURE PRMHND 2-0 FSL 18IN SILK BRAID NABSB BLK

## (undated) DEVICE — DRAPE MBL XRAY C ARM RBRBND 74X41IN UNV EQUIPMENT NS LF DISP

## (undated) DEVICE — GLOVE SURG 7.5 PROTEXIS PI LF CRM PF BEAD CUFF STRL PLISPRN

## (undated) DEVICE — SUTURE VCL+ 2-0 CT1 18IN CNTRL RELS BRAID 8 STRN

## (undated) DEVICE — GOWN SURG 2XL L3 NONREINFORCE SET IN SLV STRL LF DISP BLUE

## (undated) DEVICE — GLOVE SURG 8 PROTEXIS LF BLUE PF SMTH BEAD CUFF INTLK STRL

## (undated) DEVICE — KIT SURG POSTERIOR SPINE LF ASLMC

## (undated) DEVICE — DRAIN INCS 15FR RND BLAKE SIL 4 CHNL HBLS BNDBL TROCAR STRL

## (undated) DEVICE — GLOVE SURG 8.5 PROTEXIS PI MIC LF CRM PF SMTH BEAD CUFF STRL

## (undated) DEVICE — SPONGE SURG .5X.5IN RAYON RADOPQ PTT STRL LF DISP

## (undated) DEVICE — DRAPE EXPAND CLPSBL C ARM FLRSCP EQUIPMENT C-ARMOR STRL

## (undated) DEVICE — TRAY CATH BARD URO-PREP LUBRI-SIL CRITICORE 16FR 350ML 2500

## (undated) DEVICE — TOOL 14CM MIDAS REX LGND 6MM BALL FLUTE DSCT BURR

## (undated) DEVICE — TIP SCT ARG YNKR 22FR 25CM VINYL SMTH EYE RGD OPEN STRL LF